# Patient Record
Sex: MALE | Race: WHITE | NOT HISPANIC OR LATINO | Employment: FULL TIME | URBAN - NONMETROPOLITAN AREA
[De-identification: names, ages, dates, MRNs, and addresses within clinical notes are randomized per-mention and may not be internally consistent; named-entity substitution may affect disease eponyms.]

---

## 2019-04-04 ENCOUNTER — APPOINTMENT (OUTPATIENT)
Dept: GENERAL RADIOLOGY | Facility: HOSPITAL | Age: 29
End: 2019-04-04

## 2019-04-04 ENCOUNTER — HOSPITAL ENCOUNTER (EMERGENCY)
Facility: HOSPITAL | Age: 29
Discharge: HOME | End: 2019-04-05
Attending: EMERGENCY MEDICINE | Admitting: EMERGENCY MEDICINE

## 2019-04-04 ENCOUNTER — APPOINTMENT (OUTPATIENT)
Dept: CT IMAGING | Facility: HOSPITAL | Age: 29
End: 2019-04-04

## 2019-04-04 VITALS
WEIGHT: 315 LBS | HEIGHT: 69 IN | DIASTOLIC BLOOD PRESSURE: 78 MMHG | SYSTOLIC BLOOD PRESSURE: 156 MMHG | TEMPERATURE: 97.9 F | HEART RATE: 100 BPM | OXYGEN SATURATION: 97 % | RESPIRATION RATE: 18 BRPM | BODY MASS INDEX: 46.65 KG/M2

## 2019-04-04 DIAGNOSIS — S82.142A CLOSED FRACTURE OF LEFT TIBIAL PLATEAU, INITIAL ENCOUNTER: Primary | ICD-10-CM

## 2019-04-04 DIAGNOSIS — S81.811A LACERATION OF RIGHT LOWER LEG, INITIAL ENCOUNTER: ICD-10-CM

## 2019-04-04 PROCEDURE — 73590 X-RAY EXAM OF LOWER LEG: CPT

## 2019-04-04 PROCEDURE — 73562 X-RAY EXAM OF KNEE 3: CPT

## 2019-04-04 PROCEDURE — 76377 3D RENDER W/INTRP POSTPROCES: CPT

## 2019-04-04 PROCEDURE — 99283 EMERGENCY DEPT VISIT LOW MDM: CPT

## 2019-04-04 PROCEDURE — 73700 CT LOWER EXTREMITY W/O DYE: CPT

## 2019-04-04 RX ORDER — HYDROCODONE BITARTRATE AND ACETAMINOPHEN 7.5; 325 MG/1; MG/1
1 TABLET ORAL ONCE
Status: COMPLETED | OUTPATIENT
Start: 2019-04-04 | End: 2019-04-05

## 2019-04-04 RX ORDER — ONDANSETRON 4 MG/1
4 TABLET, ORALLY DISINTEGRATING ORAL ONCE
Status: COMPLETED | OUTPATIENT
Start: 2019-04-04 | End: 2019-04-05

## 2019-04-04 RX ORDER — BACITRACIN ZINC 500 [USP'U]/G
OINTMENT TOPICAL ONCE
Status: COMPLETED | OUTPATIENT
Start: 2019-04-04 | End: 2019-04-04

## 2019-04-04 RX ORDER — IBUPROFEN 800 MG/1
800 TABLET ORAL ONCE
Status: COMPLETED | OUTPATIENT
Start: 2019-04-04 | End: 2019-04-04

## 2019-04-04 RX ORDER — LIDOCAINE HYDROCHLORIDE 10 MG/ML
10 INJECTION, SOLUTION INFILTRATION; PERINEURAL ONCE
Status: COMPLETED | OUTPATIENT
Start: 2019-04-04 | End: 2019-04-04

## 2019-04-04 RX ORDER — ACETAMINOPHEN 325 MG/1
650 TABLET ORAL ONCE
Status: COMPLETED | OUTPATIENT
Start: 2019-04-04 | End: 2019-04-04

## 2019-04-04 RX ADMIN — IBUPROFEN 800 MG: 800 TABLET ORAL at 21:45

## 2019-04-04 RX ADMIN — ACETAMINOPHEN 650 MG: 325 TABLET, FILM COATED ORAL at 21:45

## 2019-04-04 RX ADMIN — BACITRACIN ZINC: 500 OINTMENT TOPICAL at 21:47

## 2019-04-04 RX ADMIN — LIDOCAINE HYDROCHLORIDE 10 ML: 10 INJECTION, SOLUTION INFILTRATION; PERINEURAL at 21:47

## 2019-04-05 ENCOUNTER — PREP FOR SURGERY (OUTPATIENT)
Dept: OTHER | Facility: HOSPITAL | Age: 29
End: 2019-04-05

## 2019-04-05 ENCOUNTER — HOSPITAL ENCOUNTER (OUTPATIENT)
Dept: GENERAL RADIOLOGY | Facility: HOSPITAL | Age: 29
Discharge: HOME | End: 2019-04-05
Admitting: PHYSICIAN ASSISTANT

## 2019-04-05 ENCOUNTER — APPOINTMENT (OUTPATIENT)
Dept: PREADMISSION TESTING | Facility: HOSPITAL | Age: 29
End: 2019-04-05

## 2019-04-05 ENCOUNTER — OFFICE VISIT (OUTPATIENT)
Dept: ORTHOPEDIC SURGERY | Facility: CLINIC | Age: 29
End: 2019-04-05

## 2019-04-05 VITALS
SYSTOLIC BLOOD PRESSURE: 156 MMHG | HEIGHT: 70 IN | BODY MASS INDEX: 46.63 KG/M2 | DIASTOLIC BLOOD PRESSURE: 91 MMHG | HEART RATE: 97 BPM | OXYGEN SATURATION: 97 %

## 2019-04-05 VITALS — HEIGHT: 70 IN | WEIGHT: 315 LBS | BODY MASS INDEX: 45.1 KG/M2 | RESPIRATION RATE: 15 BRPM

## 2019-04-05 DIAGNOSIS — S82.142A CLOSED FRACTURE OF LEFT TIBIAL PLATEAU, INITIAL ENCOUNTER: Primary | ICD-10-CM

## 2019-04-05 DIAGNOSIS — S82.142A CLOSED FRACTURE OF LEFT TIBIAL PLATEAU, INITIAL ENCOUNTER: ICD-10-CM

## 2019-04-05 LAB
ALBUMIN SERPL-MCNC: 4.6 G/DL (ref 3.5–5)
ALBUMIN/GLOB SERPL: 1.6 G/DL (ref 1–2)
ALP SERPL-CCNC: 95 U/L (ref 38–126)
ALT SERPL W P-5'-P-CCNC: 28 U/L (ref 13–69)
ANION GAP SERPL CALCULATED.3IONS-SCNC: 14.1 MMOL/L (ref 10–20)
AST SERPL-CCNC: 29 U/L (ref 15–46)
BASOPHILS # BLD AUTO: 0.06 10*3/MM3 (ref 0–0.2)
BASOPHILS NFR BLD AUTO: 0.4 % (ref 0–1.5)
BILIRUB SERPL-MCNC: 0.4 MG/DL (ref 0.2–1.3)
BILIRUB UR QL STRIP: NEGATIVE
BUN BLD-MCNC: 12 MG/DL (ref 7–20)
BUN/CREAT SERPL: 15 (ref 6.3–21.9)
CALCIUM SPEC-SCNC: 9.5 MG/DL (ref 8.4–10.2)
CHLORIDE SERPL-SCNC: 100 MMOL/L (ref 98–107)
CLARITY UR: CLEAR
CO2 SERPL-SCNC: 27 MMOL/L (ref 26–30)
COLOR UR: ABNORMAL
CREAT BLD-MCNC: 0.8 MG/DL (ref 0.6–1.3)
DEPRECATED RDW RBC AUTO: 46.1 FL (ref 37–54)
EOSINOPHIL # BLD AUTO: 0.15 10*3/MM3 (ref 0–0.4)
EOSINOPHIL NFR BLD AUTO: 1 % (ref 0.3–6.2)
ERYTHROCYTE [DISTWIDTH] IN BLOOD BY AUTOMATED COUNT: 14 % (ref 12.3–15.4)
GFR SERPL CREATININE-BSD FRML MDRD: 114 ML/MIN/1.73
GLOBULIN UR ELPH-MCNC: 2.9 GM/DL
GLUCOSE BLD-MCNC: 127 MG/DL (ref 74–98)
GLUCOSE UR STRIP-MCNC: NEGATIVE MG/DL
HCT VFR BLD AUTO: 46.6 % (ref 37.5–51)
HGB BLD-MCNC: 15.5 G/DL (ref 13–17.7)
HGB UR QL STRIP.AUTO: NEGATIVE
IMM GRANULOCYTES # BLD AUTO: 0.07 10*3/MM3 (ref 0–0.05)
IMM GRANULOCYTES NFR BLD AUTO: 0.5 % (ref 0–0.5)
KETONES UR QL STRIP: ABNORMAL
LEUKOCYTE ESTERASE UR QL STRIP.AUTO: NEGATIVE
LYMPHOCYTES # BLD AUTO: 2.94 10*3/MM3 (ref 0.7–3.1)
LYMPHOCYTES NFR BLD AUTO: 20.3 % (ref 19.6–45.3)
MCH RBC QN AUTO: 29.8 PG (ref 26.6–33)
MCHC RBC AUTO-ENTMCNC: 33.3 G/DL (ref 31.5–35.7)
MCV RBC AUTO: 89.4 FL (ref 79–97)
MONOCYTES # BLD AUTO: 1.08 10*3/MM3 (ref 0.1–0.9)
MONOCYTES NFR BLD AUTO: 7.5 % (ref 5–12)
NEUTROPHILS # BLD AUTO: 10.17 10*3/MM3 (ref 1.4–7)
NEUTROPHILS NFR BLD AUTO: 70.3 % (ref 42.7–76)
NITRITE UR QL STRIP: NEGATIVE
NRBC BLD AUTO-RTO: 0 /100 WBC (ref 0–0)
PH UR STRIP.AUTO: 5.5 [PH] (ref 5–8)
PLATELET # BLD AUTO: 311 10*3/MM3 (ref 140–450)
PMV BLD AUTO: 9.6 FL (ref 6–12)
POTASSIUM BLD-SCNC: 4.1 MMOL/L (ref 3.5–5.1)
PROT SERPL-MCNC: 7.5 G/DL (ref 6.3–8.2)
PROT UR QL STRIP: ABNORMAL
RBC # BLD AUTO: 5.21 10*6/MM3 (ref 4.14–5.8)
SODIUM BLD-SCNC: 137 MMOL/L (ref 137–145)
SP GR UR STRIP: >=1.03 (ref 1–1.03)
UROBILINOGEN UR QL STRIP: ABNORMAL
WBC NRBC COR # BLD: 14.47 10*3/MM3 (ref 3.4–10.8)

## 2019-04-05 PROCEDURE — 80053 COMPREHEN METABOLIC PANEL: CPT | Performed by: PHYSICIAN ASSISTANT

## 2019-04-05 PROCEDURE — 93005 ELECTROCARDIOGRAM TRACING: CPT | Performed by: PHYSICIAN ASSISTANT

## 2019-04-05 PROCEDURE — 85025 COMPLETE CBC W/AUTO DIFF WBC: CPT | Performed by: PHYSICIAN ASSISTANT

## 2019-04-05 PROCEDURE — 81003 URINALYSIS AUTO W/O SCOPE: CPT | Performed by: PHYSICIAN ASSISTANT

## 2019-04-05 PROCEDURE — 36415 COLL VENOUS BLD VENIPUNCTURE: CPT

## 2019-04-05 PROCEDURE — 87081 CULTURE SCREEN ONLY: CPT | Performed by: PHYSICIAN ASSISTANT

## 2019-04-05 PROCEDURE — 71046 X-RAY EXAM CHEST 2 VIEWS: CPT

## 2019-04-05 PROCEDURE — 99203 OFFICE O/P NEW LOW 30 MIN: CPT | Performed by: PHYSICIAN ASSISTANT

## 2019-04-05 RX ORDER — IBUPROFEN 600 MG/1
600 TABLET ORAL EVERY 8 HOURS PRN
Qty: 12 TABLET | Refills: 0 | Status: SHIPPED | OUTPATIENT
Start: 2019-04-05 | End: 2019-08-15

## 2019-04-05 RX ORDER — CEFAZOLIN SODIUM 2 G/50ML
2 SOLUTION INTRAVENOUS
Status: CANCELLED | OUTPATIENT
Start: 2019-04-11 | End: 2019-04-12

## 2019-04-05 RX ORDER — ONDANSETRON 4 MG/1
4 TABLET, ORALLY DISINTEGRATING ORAL EVERY 8 HOURS PRN
Qty: 8 TABLET | Refills: 0 | Status: SHIPPED | OUTPATIENT
Start: 2019-04-05 | End: 2019-08-15

## 2019-04-05 RX ORDER — HYDROCODONE BITARTRATE AND ACETAMINOPHEN 5; 325 MG/1; MG/1
1 TABLET ORAL EVERY 6 HOURS PRN
Qty: 12 TABLET | Refills: 0 | Status: SHIPPED | OUTPATIENT
Start: 2019-04-05 | End: 2019-04-09 | Stop reason: HOSPADM

## 2019-04-05 RX ADMIN — ONDANSETRON 4 MG: 4 TABLET, ORALLY DISINTEGRATING ORAL at 00:25

## 2019-04-05 RX ADMIN — HYDROCODONE BITARTRATE AND ACETAMINOPHEN 1 TABLET: 7.5; 325 TABLET ORAL at 00:25
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
## 2019-04-05 NOTE — DISCHARGE INSTRUCTIONS
Wear knee immobilizer use crutches until follow-up with orthopedics...... sutures to be removed in 10-12 days
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
## 2019-04-07 LAB — MRSA SPEC QL CULT: NORMAL

## 2019-04-08 RX ORDER — HYDROCODONE BITARTRATE AND ACETAMINOPHEN 7.5; 325 MG/1; MG/1
1 TABLET ORAL EVERY 6 HOURS PRN
Qty: 28 TABLET | Refills: 0 | Status: SHIPPED | OUTPATIENT
Start: 2019-04-08 | End: 2019-08-15

## 2019-04-09 ENCOUNTER — ANESTHESIA (OUTPATIENT)
Dept: PERIOP | Facility: HOSPITAL | Age: 29
End: 2019-04-09

## 2019-04-09 ENCOUNTER — HOSPITAL ENCOUNTER (OUTPATIENT)
Facility: HOSPITAL | Age: 29
Setting detail: HOSPITAL OUTPATIENT SURGERY
Discharge: HOME | End: 2019-04-09
Attending: ORTHOPAEDIC SURGERY | Admitting: ORTHOPAEDIC SURGERY

## 2019-04-09 ENCOUNTER — APPOINTMENT (OUTPATIENT)
Dept: GENERAL RADIOLOGY | Facility: HOSPITAL | Age: 29
End: 2019-04-09

## 2019-04-09 ENCOUNTER — ANESTHESIA EVENT (OUTPATIENT)
Dept: PERIOP | Facility: HOSPITAL | Age: 29
End: 2019-04-09

## 2019-04-09 VITALS
TEMPERATURE: 98.5 F | OXYGEN SATURATION: 94 % | DIASTOLIC BLOOD PRESSURE: 90 MMHG | SYSTOLIC BLOOD PRESSURE: 149 MMHG | HEART RATE: 101 BPM | RESPIRATION RATE: 18 BRPM

## 2019-04-09 LAB
DEPRECATED RDW RBC AUTO: 44.6 FL (ref 37–54)
ERYTHROCYTE [DISTWIDTH] IN BLOOD BY AUTOMATED COUNT: 13.7 % (ref 12.3–15.4)
HCT VFR BLD AUTO: 43 % (ref 37.5–51)
HGB BLD-MCNC: 14.7 G/DL (ref 13–17.7)
MCH RBC QN AUTO: 30.4 PG (ref 26.6–33)
MCHC RBC AUTO-ENTMCNC: 34.2 G/DL (ref 31.5–35.7)
MCV RBC AUTO: 89 FL (ref 79–97)
PLATELET # BLD AUTO: 335 10*3/MM3 (ref 140–450)
PMV BLD AUTO: 8.7 FL (ref 6–12)
RBC # BLD AUTO: 4.83 10*6/MM3 (ref 4.14–5.8)
WBC NRBC COR # BLD: 9.52 10*3/MM3 (ref 3.4–10.8)

## 2019-04-09 PROCEDURE — 25010000002 ONDANSETRON PER 1 MG: Performed by: NURSE ANESTHETIST, CERTIFIED REGISTERED

## 2019-04-09 PROCEDURE — 25010000002 PROPOFOL 200 MG/20ML EMULSION: Performed by: NURSE ANESTHETIST, CERTIFIED REGISTERED

## 2019-04-09 PROCEDURE — C1713 ANCHOR/SCREW BN/BN,TIS/BN: HCPCS | Performed by: ORTHOPAEDIC SURGERY

## 2019-04-09 PROCEDURE — 25010000002 ROPIVACAINE 0.75 % SOLUTION: Performed by: ORTHOPAEDIC SURGERY

## 2019-04-09 PROCEDURE — 25010000002 MIDAZOLAM PER 1 MG: Performed by: NURSE ANESTHETIST, CERTIFIED REGISTERED

## 2019-04-09 PROCEDURE — 25010000002 FENTANYL CITRATE (PF) 100 MCG/2ML SOLUTION: Performed by: NURSE ANESTHETIST, CERTIFIED REGISTERED

## 2019-04-09 PROCEDURE — 29881 ARTHRS KNE SRG MNISECTMY M/L: CPT | Performed by: ORTHOPAEDIC SURGERY

## 2019-04-09 PROCEDURE — 29855 TIBIAL ARTHROSCOPY/SURGERY: CPT | Performed by: ORTHOPAEDIC SURGERY

## 2019-04-09 PROCEDURE — 25010000002 DEXAMETHASONE SODIUM PHOSPHATE 10 MG/ML SOLUTION: Performed by: NURSE ANESTHETIST, CERTIFIED REGISTERED

## 2019-04-09 PROCEDURE — 25010000003 CEFAZOLIN SODIUM-DEXTROSE 2-3 GM-%(50ML) RECONSTITUTED SOLUTION: Performed by: PHYSICIAN ASSISTANT

## 2019-04-09 PROCEDURE — 25010000002 DEXAMETHASONE PER 1 MG: Performed by: NURSE ANESTHETIST, CERTIFIED REGISTERED

## 2019-04-09 PROCEDURE — 85027 COMPLETE CBC AUTOMATED: CPT | Performed by: ORTHOPAEDIC SURGERY

## 2019-04-09 PROCEDURE — 76000 FLUOROSCOPY <1 HR PHYS/QHP: CPT

## 2019-04-09 DEVICE — IMPLANTABLE DEVICE: Type: IMPLANTABLE DEVICE | Site: KNEE | Status: FUNCTIONAL

## 2019-04-09 RX ORDER — FENTANYL CITRATE 50 UG/ML
INJECTION, SOLUTION INTRAMUSCULAR; INTRAVENOUS AS NEEDED
Status: DISCONTINUED | OUTPATIENT
Start: 2019-04-09 | End: 2019-04-09 | Stop reason: SURG

## 2019-04-09 RX ORDER — SODIUM CHLORIDE 0.9 % (FLUSH) 0.9 %
3 SYRINGE (ML) INJECTION AS NEEDED
Status: DISCONTINUED | OUTPATIENT
Start: 2019-04-09 | End: 2019-04-09 | Stop reason: HOSPADM

## 2019-04-09 RX ORDER — DEXAMETHASONE SODIUM PHOSPHATE 10 MG/ML
INJECTION, SOLUTION INTRAMUSCULAR; INTRAVENOUS
Status: COMPLETED
Start: 2019-04-09 | End: 2019-04-09

## 2019-04-09 RX ORDER — PROPOFOL 10 MG/ML
INJECTION, EMULSION INTRAVENOUS AS NEEDED
Status: DISCONTINUED | OUTPATIENT
Start: 2019-04-09 | End: 2019-04-09 | Stop reason: SURG

## 2019-04-09 RX ORDER — SODIUM CHLORIDE 9 MG/ML
INJECTION, SOLUTION INTRAVENOUS CONTINUOUS PRN
Status: DISCONTINUED | OUTPATIENT
Start: 2019-04-09 | End: 2019-04-09 | Stop reason: SURG

## 2019-04-09 RX ORDER — KETAMINE HCL IN NACL, ISO-OSM 100MG/10ML
SYRINGE (ML) INJECTION AS NEEDED
Status: DISCONTINUED | OUTPATIENT
Start: 2019-04-09 | End: 2019-04-09 | Stop reason: SURG

## 2019-04-09 RX ORDER — LIDOCAINE HYDROCHLORIDE 10 MG/ML
INJECTION, SOLUTION INFILTRATION; PERINEURAL AS NEEDED
Status: DISCONTINUED | OUTPATIENT
Start: 2019-04-09 | End: 2019-04-09 | Stop reason: HOSPADM

## 2019-04-09 RX ORDER — ONDANSETRON 2 MG/ML
INJECTION INTRAMUSCULAR; INTRAVENOUS AS NEEDED
Status: DISCONTINUED | OUTPATIENT
Start: 2019-04-09 | End: 2019-04-09 | Stop reason: SURG

## 2019-04-09 RX ORDER — MIDAZOLAM HYDROCHLORIDE 1 MG/ML
INJECTION INTRAMUSCULAR; INTRAVENOUS AS NEEDED
Status: DISCONTINUED | OUTPATIENT
Start: 2019-04-09 | End: 2019-04-09 | Stop reason: SURG

## 2019-04-09 RX ORDER — BUPIVACAINE HYDROCHLORIDE 5 MG/ML
INJECTION, SOLUTION EPIDURAL; INTRACAUDAL
Status: COMPLETED
Start: 2019-04-09 | End: 2019-04-09

## 2019-04-09 RX ORDER — DEXAMETHASONE SODIUM PHOSPHATE 4 MG/ML
INJECTION, SOLUTION INTRA-ARTICULAR; INTRALESIONAL; INTRAMUSCULAR; INTRAVENOUS; SOFT TISSUE AS NEEDED
Status: DISCONTINUED | OUTPATIENT
Start: 2019-04-09 | End: 2019-04-09 | Stop reason: SURG

## 2019-04-09 RX ORDER — ONDANSETRON 2 MG/ML
4 INJECTION INTRAMUSCULAR; INTRAVENOUS ONCE AS NEEDED
Status: DISCONTINUED | OUTPATIENT
Start: 2019-04-09 | End: 2019-04-09 | Stop reason: HOSPADM

## 2019-04-09 RX ORDER — SODIUM CHLORIDE, SODIUM LACTATE, POTASSIUM CHLORIDE, CALCIUM CHLORIDE 600; 310; 30; 20 MG/100ML; MG/100ML; MG/100ML; MG/100ML
1000 INJECTION, SOLUTION INTRAVENOUS CONTINUOUS
Status: DISCONTINUED | OUTPATIENT
Start: 2019-04-09 | End: 2019-04-09 | Stop reason: HOSPADM

## 2019-04-09 RX ORDER — DEXAMETHASONE SODIUM PHOSPHATE 10 MG/ML
INJECTION, SOLUTION INTRAMUSCULAR; INTRAVENOUS AS NEEDED
Status: DISCONTINUED | OUTPATIENT
Start: 2019-04-09 | End: 2019-04-09 | Stop reason: SURG

## 2019-04-09 RX ORDER — BUPIVACAINE HYDROCHLORIDE 5 MG/ML
INJECTION, SOLUTION EPIDURAL; INTRACAUDAL AS NEEDED
Status: DISCONTINUED | OUTPATIENT
Start: 2019-04-09 | End: 2019-04-09 | Stop reason: SURG

## 2019-04-09 RX ORDER — MEPERIDINE HYDROCHLORIDE 50 MG/ML
50 INJECTION INTRAMUSCULAR; INTRAVENOUS; SUBCUTANEOUS ONCE AS NEEDED
Status: DISCONTINUED | OUTPATIENT
Start: 2019-04-09 | End: 2019-04-09 | Stop reason: HOSPADM

## 2019-04-09 RX ORDER — CEFAZOLIN SODIUM 2 G/50ML
2 SOLUTION INTRAVENOUS
Status: COMPLETED | OUTPATIENT
Start: 2019-04-09 | End: 2019-04-09

## 2019-04-09 RX ORDER — ROPIVACAINE HYDROCHLORIDE 7.5 MG/ML
INJECTION, SOLUTION EPIDURAL; PERINEURAL AS NEEDED
Status: DISCONTINUED | OUTPATIENT
Start: 2019-04-09 | End: 2019-04-09 | Stop reason: HOSPADM

## 2019-04-09 RX ADMIN — FENTANYL CITRATE 50 MCG: 50 INJECTION, SOLUTION INTRAMUSCULAR; INTRAVENOUS at 14:56

## 2019-04-09 RX ADMIN — PROPOFOL 150 MG: 10 INJECTION, EMULSION INTRAVENOUS at 15:02

## 2019-04-09 RX ADMIN — Medication 25 MG: at 15:03

## 2019-04-09 RX ADMIN — MIDAZOLAM HYDROCHLORIDE 2 MG: 1 INJECTION, SOLUTION INTRAMUSCULAR; INTRAVENOUS at 13:57

## 2019-04-09 RX ADMIN — DEXAMETHASONE SODIUM PHOSPHATE 10 MG: 10 INJECTION, SOLUTION INTRAMUSCULAR; INTRAVENOUS at 14:06

## 2019-04-09 RX ADMIN — SODIUM CHLORIDE, POTASSIUM CHLORIDE, SODIUM LACTATE AND CALCIUM CHLORIDE 1000 ML: 600; 310; 30; 20 INJECTION, SOLUTION INTRAVENOUS at 12:20

## 2019-04-09 RX ADMIN — SODIUM CHLORIDE: 9 INJECTION, SOLUTION INTRAVENOUS at 16:05

## 2019-04-09 RX ADMIN — Medication 252 MG: at 14:58

## 2019-04-09 RX ADMIN — FENTANYL CITRATE 50 MCG: 50 INJECTION, SOLUTION INTRAMUSCULAR; INTRAVENOUS at 16:20

## 2019-04-09 RX ADMIN — PROPOFOL 50 MG: 10 INJECTION, EMULSION INTRAVENOUS at 15:03

## 2019-04-09 RX ADMIN — BUPIVACAINE HYDROCHLORIDE 20 ML: 5 INJECTION, SOLUTION EPIDURAL; INTRACAUDAL; PERINEURAL at 14:06

## 2019-04-09 RX ADMIN — DEXAMETHASONE SODIUM PHOSPHATE 8 MG: 4 INJECTION, SOLUTION INTRAMUSCULAR; INTRAVENOUS at 15:17

## 2019-04-09 RX ADMIN — FENTANYL CITRATE 100 MCG: 50 INJECTION, SOLUTION INTRAMUSCULAR; INTRAVENOUS at 13:57

## 2019-04-09 RX ADMIN — FENTANYL CITRATE 50 MCG: 50 INJECTION, SOLUTION INTRAMUSCULAR; INTRAVENOUS at 16:33

## 2019-04-09 RX ADMIN — LIDOCAINE HYDROCHLORIDE 50 MG: 20 INJECTION, SOLUTION INTRAVENOUS at 15:01

## 2019-04-09 RX ADMIN — FENTANYL CITRATE 50 MCG: 50 INJECTION, SOLUTION INTRAMUSCULAR; INTRAVENOUS at 14:59

## 2019-04-09 RX ADMIN — ONDANSETRON 4 MG: 2 INJECTION INTRAMUSCULAR; INTRAVENOUS at 15:17

## 2019-04-09 RX ADMIN — CEFAZOLIN SODIUM 2 G: 2 SOLUTION INTRAVENOUS at 14:55

## 2019-04-09 RX ADMIN — FAMOTIDINE 20 MG: 10 INJECTION, SOLUTION INTRAVENOUS at 12:25

## 2019-04-09 NOTE — ANESTHESIA PROCEDURE NOTES
Peripheral Block      Patient reassessed immediately prior to procedure    Start time: 4/9/2019 1:56 PM  Stop time: 4/9/2019 2:06 PM  Reason for block: procedure for pain and post-op pain management  Performed by  CRNA: Ambrose Antony CRNA  Preanesthetic Checklist  Completed: patient identified, site marked, surgical consent, pre-op evaluation, timeout performed, IV checked, risks and benefits discussed and monitors and equipment checked  Prep:  Pt Position: supine  Sterile barriers:cap, gloves and mask  Prep: ChloraPrep  Patient monitoring: blood pressure monitoring, continuous pulse oximetry and EKG  Procedure  Sedation:yes    Guidance:ultrasound guided  ULTRASOUND INTERPRETATION.  Using ultrasound guidance a 21 G gauge needle was placed in close proximity to the femoral nerve, at which point, under ultrasound guidance anesthetic was injected in the area of the nerve and spread of the anesthesia was seen on ultrasound in close proximity thereto.  There were no abnormalities seen on ultrasound; a digital image was taken; and the patient tolerated the procedure with no complications. Images:still images obtained    Laterality:left  Block Type:adductor canal block  Injection Technique:single-shot  Needle Type:echogenic  Needle Gauge:21 G  Resistance on Injection: none    Post Assessment  Injection Assessment: negative aspiration for heme, no paresthesia on injection and incremental injection  Patient Tolerance:comfortable throughout block  Complications:no  Additional Notes   Incremental injection with negative aspirate. No pain on injection. Normal injection resistance.  Vascular puncture avoided. Nerves and surrounding tissues identified with local spread around nerves visualized.

## 2019-04-09 NOTE — DISCHARGE INSTRUCTIONS
Keep the affected extremity elevated above  level of the heart.  Use your ice pack as instructed, do not use continuously.  Use your crutches as directed  NO WEIGHT BEARING TO LEFT LEG/FOOT    Follow your physicians instructions as previously directed.    No pushing, pulling, tugging,  heavy lifting, or strenuous activity.  No major decision making, driving, or drinking alcoholic beverages for 24 hours. ( due to the medications you have  received)  Always use good hand hygiene/washing techniques.  NO driving while taking pain medications.    To assist you in voiding:  Drink plenty of fluids  Listen to running water while attempting to void.    If you are unable to urinate and you have an uncomfortable urge to void or it has been   6 hours since you were discharged, return to the Emergency Room

## 2019-04-09 NOTE — ANESTHESIA POSTPROCEDURE EVALUATION
Patient: Sandor Cordon    Procedure Summary     Date:  04/09/19 Room / Location:  [facility] OR  /  [facility]    Anesthesia Start:  1455 Anesthesia Stop:      Procedure:  knee diagnostic arthroscopy left, reduction of tibial plateau fracture, fixation with two compression screws, assessment of ACL rupture, partial lateral meniscectomy, lateral chondroplasty (Left Knee) Diagnosis:       Closed fracture of left tibial plateau, initial encounter      (Closed fracture of left tibial plateau, initial encounter [S82.142A])    Surgeon:  Wes Maradiaga MD Provider:  Adalberto Mueller CRNA    Anesthesia Type:  general with block ASA Status:  3          Anesthesia Type: general with block  Last vitals  BP   135/71 (04/09/19 1448)   Temp   98.1 °F (36.7 °C) (04/09/19 1149)   Pulse   88 (04/09/19 1448)   Resp   16 (04/09/19 1448)     SpO2   100 % (04/09/19 1448)     Post Anesthesia Care and Evaluation    Patient location during evaluation: PACU  Patient participation: complete - patient participated  Level of consciousness: awake  Pain score: 3  Pain management: adequate  Airway patency: patent  Anesthetic complications: No anesthetic complications  PONV Status: none  Cardiovascular status: acceptable  Respiratory status: acceptable and face mask  Hydration status: acceptable    Comments: vsss resp spont, reflexes intact, responsive, report given to pacu nurse

## 2019-04-09 NOTE — ANESTHESIA PROCEDURE NOTES
Airway  Urgency: elective      General Information and Staff    Patient location during procedure: OR  CRNA: Adalberto Mueller CRNA    Indications and Patient Condition  Indications: management.    Preoxygenated: yes  Mask difficulty assessment: 2 - vent by mask + OA or adjuvant +/- NMBA    Final Airway Details  Final airway type: supraglottic airway      Successful airway: classic  Size 4    Number of attempts at approach: 1    Additional Comments  Lma placed by standard fashion, cuff up with mov  approx 30 ml, bbs and expansion =, + etco, tolerated without adverse rx.

## 2019-04-09 NOTE — INTERVAL H&P NOTE
H&P reviewed. The patient was examined and there are no changes to the H&P, inclusive of the physical exam heart, lungs and procedure site specific exam as noted on the current (within 30 days) history and physical full detailed report.    Vitals:    04/09/19 1428   BP: 138/88   Pulse: 90   Resp: 16   Temp:    SpO2: 100%       Wes Maradiaga MD  4/9/2019  2:39 PM

## 2019-04-09 NOTE — ANESTHESIA PREPROCEDURE EVALUATION
Anesthesia Evaluation     Patient summary reviewed and Nursing notes reviewed   NPO Solid Status: > 8 hours  NPO Liquid Status: > 8 hours           Airway   Mallampati: II  TM distance: >3 FB  Neck ROM: full  Possible difficult intubation  Dental      Pulmonary    (+) a smoker Current, COPD, sleep apnea, decreased breath sounds,   Cardiovascular     (+) hypertension,       Neuro/Psych  GI/Hepatic/Renal/Endo    (+) obesity, morbid obesity,  diabetes mellitus ( probable obese, genaro, fbs elevated) type 2 poorly controlled,     Musculoskeletal     (+) chronic pain, myalgias,   Abdominal   (+) obese,    Substance History   (+) alcohol use,      OB/GYN          Other        ROS/Med Hx Other: Labs reviewed  cxr nad  ekg sr                    Anesthesia Plan    ASA 3     general with block     intravenous induction   Anesthetic plan, all risks, benefits, and alternatives have been provided, discussed and informed consent has been obtained with: patient.

## 2019-04-18 NOTE — OP NOTE
OPERATIVE REPORT      PATIENT NAME:  Sandor Cordon                            YOB: 1990       PREOP DIAGNOSIS:    Left lateral tibia plateau posterolateral depressed unstable fracture and ACL rupture.    POSTOP DIAGNOSIS:   Same plus traumatic displaced fragmented posterior horn lateral meniscus tear.    PROCEDURE:    Left knee diagnostic arthroscopy with arthroscopic assisted reduction and compression screws internal fixation of lateral tibia plateau fracture, partial lateral menisectomy and two compartment chondroplasty with assessment of ACL rupture.    SURGEON:     Kelvin Maradiaga MD    OPERATIVE TEAM:  Circulator: Du Craft RN; Matt Mantilla RN  Radiology Technologist: Jacek Menjivar Rebecca  Scrub Person: Joel Hays; Kelly Sharma    ANESTHETIST:  LACEY: Adalberto Mueller CRNA    ANESTHESIA:   General    ESTIM BLOOD LOSS:   25 ml    FINDINGS:     Displaced depressed unstable posterior lateral tibial plateau fracture with traumatic displaced posterior horn lateral meniscus tears and chondral injuries.  Near complete proximal substance ACL rupture and insufficiency.    SPECIMENS:    None.    IMPLANTS:     Quantity of two Biomet 5.0 mm diameter partial thread cannulated compression screws.    DRAINS:     None.    COMPLICATIONS:    None.    DISPOSITION:    Stable to recovery.    INDICATIONS:   Painful traumatic knee effusion with depressed displaced and unstable posterior tibial plateau fracture with meniscal, chondral and ACL injuries and tears on clinical exam and imaging.    NARRATIVE:     Risks, benefits and alternatives discussed and informed consent for surgical procedure obtained.  Risks discussed including but not limited to anesthesia, infection, nerve/vessel/tendon injury, bone fracture, hardware changes, loosening or displacement, morbidities from any chronic medical conditions, DVT, PE and death.  Goals include pain relief, earlier mobilization and  rehabilitation to improve ambulatory and functional level.  Patient presents for planned fracture stabilization surgery.     Antibiotic prophylaxis was given.  Surgeon site marking and a time out were performed prior to the procedure.  Anesthesia was effective and well tolerated.  The patient was placed in the supine position with appropriate padding to observe skin precautions.  Also, care was taken to provide DVT/PE prophylaxis including athrombic protocol.  The knee and leg was prepped and draped in the usual sterile fashion.  A tourniquet was not used and there was good hemostasis throughout the procedure.  At the start of the procedure one percent lidocaine with epinephrine was injected at the knee portal sites.  After sterile prep and drape an arthroscopy of the knee was performed.    Anteromedial and anterolateral portals were made.  The patellofemoral compartment showed stable chondral surfaces though with some grade 3 crevice and fibrillation wear.  Patella tracking was central and stable.  In the medial compartment the medial meniscus and chondral surfaces were intact.  There was a patch of grade 3 fibrillation of the medial femoral condyle.  In the intercondylar notch, there was diffuse erythema of the ACL ligament and nearly complete rupture of the proximal ACL ligament substance consistent with recent injury. The PCL was visualized, probed and intact.  The lateral compartment showed 12-15 mm of depression of the posterior aspect lateral plateau and not central and contained though rather posterior and non-contained.  Consideration for subchondroplasty bone void filler was dismissed as with the bone fracture not contained there would likely be posterior leakage of the bone void  the hamstring area and as such the fracture not amenable to subchondroplasty.  The lateral tibia plateau had grade 3 and 4 osteochondral wear adjacent to the fracture.  There were small loose osteochondral chips that  were debrided and removed.  The lateral meniscus had a large tear with the torn portion stuck under a fracture fragment though could be easily released with a blunt nerve probe.  The popliteus tendon was visualized, probed and intact.  Using a grabber, multiple biters including straight, upward, curved and side biters, and a full radius shaver, removal of loose bodies, a partial lateral menisectomy and a two compartment chondroplasty of the involved areas were performed.  With C-arm image flouro, a small anterolateral incision permitted blunt curved clamp maneuvering of the depressed posterior lateral tibial plateau fracture main fragment.  With the arthroscope and C-arm images, an anatomic elevation of the depressed plateau fragment was confirmed.  The fracture reduction could then be stabilized with placement of two subchondral cannulated partial threaded compression screws with standard soft tissue protected drilling, depth gauge measure and screw placement.    Representative arthroscopic photos and C-arm images were saved showing a good reduction and position of the compression screws.  The knee joint was irrigated and suctioned clear.  The portal sites and percutaneous incisions were closed and a sterile dressing was applied.  A well padded knee immobilizer was applied.  Anesthesia was effective and well tolerated.  There were no complications of the procedure.  The patient was transferred stable to recovery.

## 2019-04-23 ENCOUNTER — HOSPITAL ENCOUNTER (OUTPATIENT)
Dept: ULTRASOUND IMAGING | Facility: HOSPITAL | Age: 29
Discharge: HOME | End: 2019-04-23
Admitting: PHYSICIAN ASSISTANT

## 2019-04-23 ENCOUNTER — OFFICE VISIT (OUTPATIENT)
Dept: ORTHOPEDIC SURGERY | Facility: CLINIC | Age: 29
End: 2019-04-23

## 2019-04-23 VITALS
OXYGEN SATURATION: 97 % | HEART RATE: 130 BPM | RESPIRATION RATE: 18 BRPM | HEIGHT: 70 IN | WEIGHT: 315 LBS | BODY MASS INDEX: 45.1 KG/M2

## 2019-04-23 DIAGNOSIS — R60.0 LEG EDEMA, LEFT: ICD-10-CM

## 2019-04-23 DIAGNOSIS — S82.142A CLOSED FRACTURE OF LEFT TIBIAL PLATEAU, INITIAL ENCOUNTER: ICD-10-CM

## 2019-04-23 DIAGNOSIS — S82.142A CLOSED FRACTURE OF LEFT TIBIAL PLATEAU, INITIAL ENCOUNTER: Primary | ICD-10-CM

## 2019-04-23 PROCEDURE — 93971 EXTREMITY STUDY: CPT

## 2019-04-23 PROCEDURE — 99024 POSTOP FOLLOW-UP VISIT: CPT | Performed by: PHYSICIAN ASSISTANT

## 2019-04-23 NOTE — PROGRESS NOTES
"Subjective   Patient ID: Sandor Cordon is a 28 y.o.  male is here today for a post-operative visit.  Post-op of the Left Knee (knee diagnostic arthroscopy left, reduction of tibial plateau fracture, fixation with two compression screws, assessment of ACL rupture, partial lateral meniscectomy, lateral chondroplasty 4/9/19)          CHIEF COMPLAINT:    History of Present Illness      Pain controlled: [] no   [x] yes   Medication refill requested: [x] no   [] yes    Patient compliant with instructions: [] no   [x] yes   Other: Reports fair progress since surgery.  Patient states when he stands for long periods of time and uses the knee brace his lower leg will turn dark purple/dark red but usually subsides with elevation rest     Past Medical History:   Diagnosis Date   • Hypertension         Past Surgical History:   Procedure Laterality Date   • NO PAST SURGERIES         No Known Allergies    Review of Systems   Constitutional: Negative for fever.   HENT: Negative for voice change.    Eyes: Negative for visual disturbance.   Respiratory: Negative for shortness of breath.    Cardiovascular: Negative for chest pain.   Gastrointestinal: Negative for abdominal distention and abdominal pain.   Genitourinary: Negative for dysuria.   Musculoskeletal: Positive for arthralgias. Negative for gait problem and joint swelling.   Skin: Negative for rash.   Neurological: Negative for speech difficulty.   Hematological: Does not bruise/bleed easily.   Psychiatric/Behavioral: Negative for confusion.       Objective   Pulse (!) 130   Resp 18   Ht 177.8 cm (70\")   Wt (!) 147 kg (325 lb)   SpO2 97%   BMI 46.63 kg/m²       Signs of infection: [x] no                    [] yes   Drainage: [x] no                    [] yes   Incision: [x] healing well     []healed well   Motor exam intact: [] no                    [x] yes   Neurovascular exam intact: [] no                    [x] yes   Signs of compartment syndrome: [x] no                "     [] yes   Signs of DVT: [x] no                    [] yes   Other:      Physical Exam   Constitutional: He appears well-developed.   Pulmonary/Chest: Effort normal.   Musculoskeletal:        Left knee: He exhibits swelling and ecchymosis. He exhibits no erythema. Tenderness found. Medial joint line and lateral joint line tenderness noted.   Neurological: He is alert.   Psychiatric: He has a normal mood and affect.   Vitals reviewed.    Ortho Exam    Extremity DVT signs are Positive with calf pain  Neurologic Exam    Assessment/Plan    Independent Review of Radiographic Studies:    Indication to evaluate fracture healing, and compared with prior imaging, shows interm fracture healing, callus formation and or periostitis in continued good position and alignment.  Laboratory and Other Studies:  No new results reviewed today.   Medical Decision Making:    Stable neurovascular exam.       Procedures     Sandor was seen today for post-op.    Diagnoses and all orders for this visit:    Closed fracture of left tibial plateau, initial encounter  -     US Venous Doppler Lower Extremity Left (duplex); Future  -     XR Knee 1 or 2 View Left    Leg edema, left  -     US Venous Doppler Lower Extremity Left (duplex); Future         Recommendations/Plan:     Sutures Staples or Pins [x] Removed today  [] At prior visit  [] Plan removal later   Physical therapy: []rehab facility  []outpatient referral  [] therapy ongoing not ordered now   Ultrasound: []not ordered         [x]order given to patient   Labs: [x]not ordered         []order given to patient   Weight Bearing status: []Full []WBAT []PWB []NWB []Other TTWB     Elevate leg for residual swelling  Ice, heat, and/or modalities as beneficial  Watch for signs and symptoms of infection  Wound care instructions given    There was a possibility that a small suture was remaining to the lateral horizontal incision site.  Patient is advised to monitor this closely for signs of  infection.     We will send for stat venous Doppler ultrasound.  I suspect he is experiencing dependent rubor to the left lower extremity as while he is lying horizontal and elevating the leg in the exam room there are no signs of skin discoloration.  He is neurovascularly intact.    FU in 2 weeks- plan to enroll in PT    Patient is encouraged and agreeable to call or return sooner for any issues or concerns.

## 2019-04-24 ENCOUNTER — APPOINTMENT (OUTPATIENT)
Dept: ULTRASOUND IMAGING | Facility: HOSPITAL | Age: 29
End: 2019-04-24

## 2019-05-07 ENCOUNTER — OFFICE VISIT (OUTPATIENT)
Dept: ORTHOPEDIC SURGERY | Facility: CLINIC | Age: 29
End: 2019-05-07

## 2019-05-07 VITALS — HEIGHT: 70 IN | BODY MASS INDEX: 45.1 KG/M2 | RESPIRATION RATE: 18 BRPM | WEIGHT: 315 LBS

## 2019-05-07 DIAGNOSIS — S83.512A RUPTURE OF ANTERIOR CRUCIATE LIGAMENT OF LEFT KNEE, INITIAL ENCOUNTER: ICD-10-CM

## 2019-05-07 DIAGNOSIS — S82.142D CLOSED FRACTURE OF LEFT TIBIAL PLATEAU WITH ROUTINE HEALING, SUBSEQUENT ENCOUNTER: Primary | ICD-10-CM

## 2019-05-07 PROCEDURE — 99024 POSTOP FOLLOW-UP VISIT: CPT | Performed by: PHYSICIAN ASSISTANT

## 2019-05-07 NOTE — PROGRESS NOTES
"Subjective   Patient ID: Sandor Cordon is a 28 y.o. right hand dominant male is here today for a post-operative visit.  Post-op and Pain of the Left Knee (Patient is here today for a post operative visit, he states his pain only comes if he's up a lot on the knee. )          CHIEF COMPLAINT:      History of Present Illness      Pain controlled: [] no   [x] yes   Medication refill requested: [x] no   [] yes    Patient compliant with instructions: [] no   [x] yes   Other: Reports good progress since surgery.     Past Medical History:   Diagnosis Date   • Hypertension         Past Surgical History:   Procedure Laterality Date   • NO PAST SURGERIES         No Known Allergies    Review of Systems   Constitutional: Negative for fever.   HENT: Negative for voice change.    Eyes: Negative for visual disturbance.   Respiratory: Negative for shortness of breath.    Cardiovascular: Negative for chest pain.   Gastrointestinal: Negative for abdominal distention and abdominal pain.   Genitourinary: Negative for dysuria.   Musculoskeletal: Positive for arthralgias. Negative for gait problem and joint swelling.   Skin: Negative for rash.   Neurological: Negative for speech difficulty.   Hematological: Does not bruise/bleed easily.   Psychiatric/Behavioral: Negative for confusion.       Objective   Resp 18   Ht 177.8 cm (70\")   Wt (!) 147 kg (325 lb)   BMI 46.63 kg/m²       Signs of infection: [x] no                    [] yes   Drainage: [x] no                    [] yes   Incision: [x] healing well     []healed well   Motor exam intact: [] no                    [x] yes   Neurovascular exam intact: [] no                    [x] yes   Signs of compartment syndrome: [x] no                    [] yes   Signs of DVT: [x] no                    [] yes   Other:      Physical Exam   Constitutional: He is oriented to person, place, and time. He appears well-developed.   Pulmonary/Chest: Effort normal.   Musculoskeletal:        Left knee: He " exhibits decreased range of motion (stiffness from recent immobilization). He exhibits no effusion, no ecchymosis and no erythema.   Neurological: He is alert and oriented to person, place, and time.   Psychiatric: He has a normal mood and affect.   Vitals reviewed.    Left Knee Exam     Range of Motion   Extension: 0   Flexion: 80     Tests   Drawer:  Anterior - 1+         Other   Erythema: absent  Scars: present  Sensation: normal  Swelling: mild  Effusion: no effusion present            Extremity DVT signs are Negative on physical exam with negative Nicolas sign, with no calf pain, with no palpable cords, with no increased pain with passive stretch/extension and with no skin tone change  Neurologic Exam     Mental Status   Oriented to person, place, and time.         Assessment/Plan   Independent Review of Radiographic Studies:    Indication to evaluate fracture healing, and compared with prior imaging, shows interim fracture healing with good maintained reduction and alignment and intact position of fracture fixation hardware.    Medical Decision Making:    Stable neurovascular exam.       Elizabeth Patten was seen today for post-op and pain.    Diagnoses and all orders for this visit:    Closed fracture of left tibial plateau with routine healing, subsequent encounter  -     XR Knee 1 or 2 View Left  -     Ambulatory Referral to Physical Therapy Evaluate and treat (Pre-ACL reconstruction rehab), Ortho; Stretching, ROM, Strengthening    Rupture of anterior cruciate ligament of left knee, initial encounter  -     Ambulatory Referral to Physical Therapy Evaluate and treat (Pre-ACL reconstruction rehab), Ortho; Stretching, ROM, Strengthening         Recommendations/Plan:     Sutures Staples or Pins [] Removed today  [x] At prior visit  [] Plan removal later   Physical therapy: []rehab facility  [x]outpatient referral  [] therapy ongoing   Ultrasound: [x]not ordered         []order given to patient   Labs:  [x]not ordered         []order given to patient   Weight Bearing status: []Full [x]WBAT []PWB []NWB []Other                No longer used a knee immobilizer.   Patient was provided a hinged knee brace to be worn for the instability of his ACL rupture.  He states he would like to wait till July 2019 to schedule reconstruction of ACL left knee  Patient is encouraged and agreeable to call or return sooner for any issues or concerns.

## 2019-05-08 ENCOUNTER — TREATMENT (OUTPATIENT)
Dept: PHYSICAL THERAPY | Facility: CLINIC | Age: 29
End: 2019-05-08

## 2019-05-08 DIAGNOSIS — Z98.890 S/P LATERAL MENISCECTOMY OF LEFT KNEE: ICD-10-CM

## 2019-05-08 DIAGNOSIS — S82.142A CLOSED FRACTURE OF LEFT TIBIAL PLATEAU, INITIAL ENCOUNTER: Primary | ICD-10-CM

## 2019-05-08 DIAGNOSIS — S83.512A RUPTURE OF ANTERIOR CRUCIATE LIGAMENT OF LEFT KNEE, INITIAL ENCOUNTER: ICD-10-CM

## 2019-05-08 PROCEDURE — 97162 PT EVAL MOD COMPLEX 30 MIN: CPT | Performed by: PHYSICAL THERAPIST

## 2019-05-08 PROCEDURE — 97110 THERAPEUTIC EXERCISES: CPT | Performed by: PHYSICAL THERAPIST
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
## 2019-05-10 ENCOUNTER — TREATMENT (OUTPATIENT)
Dept: PHYSICAL THERAPY | Facility: CLINIC | Age: 29
End: 2019-05-10

## 2019-05-10 DIAGNOSIS — S82.142A CLOSED FRACTURE OF LEFT TIBIAL PLATEAU, INITIAL ENCOUNTER: Primary | ICD-10-CM

## 2019-05-10 DIAGNOSIS — Z98.890 S/P LATERAL MENISCECTOMY OF LEFT KNEE: ICD-10-CM

## 2019-05-10 DIAGNOSIS — S83.512A RUPTURE OF ANTERIOR CRUCIATE LIGAMENT OF LEFT KNEE, INITIAL ENCOUNTER: ICD-10-CM

## 2019-05-10 PROCEDURE — 97116 GAIT TRAINING THERAPY: CPT | Performed by: PHYSICAL THERAPIST

## 2019-05-10 PROCEDURE — 97110 THERAPEUTIC EXERCISES: CPT | Performed by: PHYSICAL THERAPIST

## 2019-05-15 ENCOUNTER — TREATMENT (OUTPATIENT)
Dept: PHYSICAL THERAPY | Facility: CLINIC | Age: 29
End: 2019-05-15

## 2019-05-15 DIAGNOSIS — Z98.890 S/P LATERAL MENISCECTOMY OF LEFT KNEE: ICD-10-CM

## 2019-05-15 DIAGNOSIS — S82.142A CLOSED FRACTURE OF LEFT TIBIAL PLATEAU, INITIAL ENCOUNTER: Primary | ICD-10-CM

## 2019-05-15 DIAGNOSIS — S83.512A RUPTURE OF ANTERIOR CRUCIATE LIGAMENT OF LEFT KNEE, INITIAL ENCOUNTER: ICD-10-CM

## 2019-05-15 PROCEDURE — 97110 THERAPEUTIC EXERCISES: CPT | Performed by: PHYSICAL THERAPIST

## 2019-05-15 PROCEDURE — 97116 GAIT TRAINING THERAPY: CPT | Performed by: PHYSICAL THERAPIST

## 2019-05-15 NOTE — PROGRESS NOTES
Subjective   Sandor Cordon reports: No pain at rest. Worked some this week which was painful but has recuperated well on day off.     Objective   Patient using single crutch appropriately.     Left knee AAROM: 95 deg flexion     Stairs: able to step up/down 6 inch sten with hand rail.     See Exercise, Manual, and Modality Logs for complete treatment.       Assessment/Plan  Patient progressing well with strengthening exercises. ROM is improving, pt able to step up and down 6 inch step with good form and control.   Progress per Plan of Care           Manual Therapy:         mins  36598;  Therapeutic Exercise:    41     mins  12488;     Neuromuscular Shaniqua:        mins  39813;    Therapeutic Activity:          mins  59468;     Gait Training:      15     mins  90443;     Ultrasound:          mins  19868;   Iontophoresis          mins  79832   Electrical Stimulation:         mins  67047 ( );  Dry Needling         mins self-pay  Fluidotherapy          mins 88672    Timed Treatment:   56   mins   Total Treatment:     66   mins    Yani Haley, PT  Physical Therapist

## 2019-05-17 ENCOUNTER — TREATMENT (OUTPATIENT)
Dept: PHYSICAL THERAPY | Facility: CLINIC | Age: 29
End: 2019-05-17

## 2019-05-17 DIAGNOSIS — Z98.890 S/P LATERAL MENISCECTOMY OF LEFT KNEE: ICD-10-CM

## 2019-05-17 DIAGNOSIS — S83.512A RUPTURE OF ANTERIOR CRUCIATE LIGAMENT OF LEFT KNEE, INITIAL ENCOUNTER: ICD-10-CM

## 2019-05-17 DIAGNOSIS — S82.142A CLOSED FRACTURE OF LEFT TIBIAL PLATEAU, INITIAL ENCOUNTER: Primary | ICD-10-CM

## 2019-05-17 PROCEDURE — 97116 GAIT TRAINING THERAPY: CPT | Performed by: PHYSICAL THERAPIST

## 2019-05-17 PROCEDURE — 97110 THERAPEUTIC EXERCISES: CPT | Performed by: PHYSICAL THERAPIST

## 2019-05-17 NOTE — PROGRESS NOTES
Subjective   Sandor Cordon reports: 5/10 knee pain and soreness, has been on feet a lot today.     Objective   Left knee AAROM: 95 deg flexion     See Exercise, Manual, and Modality Logs for complete treatment.       Assessment/Plan  Patient tolerated exercises well, increased swelling today. Will continue working on knee mobility and progressive strengthening.   Progress per Plan of Care           Manual Therapy:         mins  03330;  Therapeutic Exercise:    42     mins  11274;     Neuromuscular Shaniqua:        mins  36541;    Therapeutic Activity:          mins  32892;     Gait Trainin     mins  58146;     Ultrasound:          mins  59982;   Iontophoresis         mins  97511   Electrical Stimulation:         mins  73516 ( );  Dry Needling          mins self-pay  Fluidotherapy          mins 40401    Timed Treatment:   55   mins   Total Treatment:     65   mins    Yani Haley PT  Physical Therapist

## 2019-05-21 ENCOUNTER — TREATMENT (OUTPATIENT)
Dept: PHYSICAL THERAPY | Facility: CLINIC | Age: 29
End: 2019-05-21

## 2019-05-21 DIAGNOSIS — S82.142A CLOSED FRACTURE OF LEFT TIBIAL PLATEAU, INITIAL ENCOUNTER: Primary | ICD-10-CM

## 2019-05-21 DIAGNOSIS — S83.512A RUPTURE OF ANTERIOR CRUCIATE LIGAMENT OF LEFT KNEE, INITIAL ENCOUNTER: ICD-10-CM

## 2019-05-21 DIAGNOSIS — Z98.890 S/P LATERAL MENISCECTOMY OF LEFT KNEE: ICD-10-CM

## 2019-05-21 PROCEDURE — 97116 GAIT TRAINING THERAPY: CPT | Performed by: PHYSICAL THERAPIST

## 2019-05-21 PROCEDURE — 97110 THERAPEUTIC EXERCISES: CPT | Performed by: PHYSICAL THERAPIST

## 2019-05-21 NOTE — PROGRESS NOTES
Subjective   Sandor Cordon reports: Knee sore today but not too bad.     Objective   Left knee AAROM: 105 deg     See Exercise, Manual, and Modality Logs for complete treatment.       Assessment/Plan  Patient progressing well with exercises. Knee mobility improving. Will start SL activity next visit.   Progress per Plan of Care           Manual Therapy:         mins  35460;  Therapeutic Exercise:    43     mins  03320;     Neuromuscular Shaniqua:        mins  90090;    Therapeutic Activity:          mins  66860;     Gait Training:      10     mins  52229;     Ultrasound:          mins  07267;   Iontophoresis          mins  80731   Electrical Stimulation:         mins  93390 ( );  Dry Needling          mins self-pay  Fluidotherapy          mins 47909    Timed Treatment:   53   mins   Total Treatment:     63   mins    Yani Haley PT  Physical Therapist

## 2019-05-24 ENCOUNTER — TREATMENT (OUTPATIENT)
Dept: PHYSICAL THERAPY | Facility: CLINIC | Age: 29
End: 2019-05-24

## 2019-05-24 PROCEDURE — 97116 GAIT TRAINING THERAPY: CPT | Performed by: PHYSICAL THERAPIST

## 2019-05-24 PROCEDURE — 97110 THERAPEUTIC EXERCISES: CPT | Performed by: PHYSICAL THERAPIST

## 2019-05-24 PROCEDURE — 97530 THERAPEUTIC ACTIVITIES: CPT | Performed by: PHYSICAL THERAPIST

## 2019-05-24 NOTE — PROGRESS NOTES
Subjective   Sandor Cordon reports: Knee sore and a little swollen feeling, has been on it a lot today. Feels like he is carrying his crutch around at this point. Knee has felt stable.     Objective   Left knee AAROM: 100 deg flexion    SLS: pt able to balance 30 sec on unstable surface with mod postural sway    Gait: delayed knee flexion in mid-terminal stance     See Exercise, Manual, and Modality Logs for complete treatment.       Assessment/Plan  Patient progressing well with strengthening. Began single leg activity without pain. Had discussion of need for ACL repair. Patient does no running with quick stops or quick cutting. Patient is showing great improvement with knee stability without pain. Will continue to work on knee stability and mobility.   Progress per Plan of Care           Manual Therapy:         mins  72192;  Therapeutic Exercise:    35     mins  13205;     Neuromuscular Shaniqua:       mins  64546;    Therapeutic Activity:     10     mins  14407;     Gait Training:      10     mins  50910;     Ultrasound:          mins  35640;   Iontophoresis          mins  55700   Electrical Stimulation:         mins  99684 ( );  Dry Needling          mins self-pay  Fluidotherapy          mins 09957    Timed Treatment:  55    mins   Total Treatment:    65    mins    Yani Haley PT  Physical Therapist

## 2019-05-28 ENCOUNTER — TREATMENT (OUTPATIENT)
Dept: PHYSICAL THERAPY | Facility: CLINIC | Age: 29
End: 2019-05-28

## 2019-05-28 DIAGNOSIS — Z98.890 S/P LATERAL MENISCECTOMY OF LEFT KNEE: ICD-10-CM

## 2019-05-28 DIAGNOSIS — S82.142A CLOSED FRACTURE OF LEFT TIBIAL PLATEAU, INITIAL ENCOUNTER: Primary | ICD-10-CM

## 2019-05-28 DIAGNOSIS — S83.512A RUPTURE OF ANTERIOR CRUCIATE LIGAMENT OF LEFT KNEE, INITIAL ENCOUNTER: ICD-10-CM

## 2019-05-28 PROCEDURE — 97140 MANUAL THERAPY 1/> REGIONS: CPT | Performed by: PHYSICAL THERAPIST

## 2019-05-28 PROCEDURE — 97116 GAIT TRAINING THERAPY: CPT | Performed by: PHYSICAL THERAPIST

## 2019-05-28 PROCEDURE — 97110 THERAPEUTIC EXERCISES: CPT | Performed by: PHYSICAL THERAPIST

## 2019-05-28 NOTE — PROGRESS NOTES
Subjective   Sandor Cordon reports: No pain at rest. Has not had any issues walking at home without crutch.     Objective   Knee AAROM: 102 deg     See Exercise, Manual, and Modality Logs for complete treatment.       Assessment/Plan  Patient progressing well with strengthening, will focus more on passive and active knee mobility.   Progress per Plan of Care           Manual Therapy:    13     mins  83740;  Therapeutic Exercise:    32     mins  10940;     Neuromuscular Shaniqua:        mins  65714;    Therapeutic Activity:          mins  38959;     Gait Training:      10     mins  57467;     Ultrasound:          mins  16719;   Iontophoresis          mins  02958   Electrical Stimulation:         mins  27695 ( );  Dry Needling          mins self-pay  Fluidotherapy          mins 97599    Timed Treatment:   55   mins   Total Treatment:     65   mins    Yani Haley PT  Physical Therapist

## 2019-05-31 ENCOUNTER — TREATMENT (OUTPATIENT)
Dept: PHYSICAL THERAPY | Facility: CLINIC | Age: 29
End: 2019-05-31

## 2019-05-31 DIAGNOSIS — S83.512A RUPTURE OF ANTERIOR CRUCIATE LIGAMENT OF LEFT KNEE, INITIAL ENCOUNTER: ICD-10-CM

## 2019-05-31 DIAGNOSIS — Z98.890 S/P LATERAL MENISCECTOMY OF LEFT KNEE: ICD-10-CM

## 2019-05-31 DIAGNOSIS — S82.142A CLOSED FRACTURE OF LEFT TIBIAL PLATEAU, INITIAL ENCOUNTER: Primary | ICD-10-CM

## 2019-05-31 PROCEDURE — 97116 GAIT TRAINING THERAPY: CPT | Performed by: PHYSICAL THERAPIST

## 2019-05-31 PROCEDURE — 97140 MANUAL THERAPY 1/> REGIONS: CPT | Performed by: PHYSICAL THERAPIST

## 2019-05-31 PROCEDURE — 97110 THERAPEUTIC EXERCISES: CPT | Performed by: PHYSICAL THERAPIST

## 2019-05-31 NOTE — PROGRESS NOTES
Subjective   Sandor Cordon reports: No pain at rest. Was a little painful earlier today at work, about a 3/10    Objective   Knee AAROM: 108 deg flexion       See Exercise, Manual, and Modality Logs for complete treatment.       Assessment/Plan  Patient tolerated exercises well. Progressing with knee mobility slowly. Patient educated on additional knee flexion stretch at home.   Progress strengthening /stabilization /functional activity           Manual Therapy:    10     mins  68508;  Therapeutic Exercise:    34     mins  19953;     Neuromuscular Shaniqua:        mins  45859;    Therapeutic Activity:          mins  00794;     Gait Training:      10     mins  89018;     Ultrasound:          mins  41988;   Iontophoresis          mins  59158   Electrical Stimulation:         mins  88738 ( );  Dry Needling          mins self-pay  Fluidotherapy          mins 42131    Timed Treatment:  54    mins   Total Treatment:     64   mins    Yani Haley PT  Physical Therapist

## 2019-06-04 ENCOUNTER — TREATMENT (OUTPATIENT)
Dept: PHYSICAL THERAPY | Facility: CLINIC | Age: 29
End: 2019-06-04

## 2019-06-04 DIAGNOSIS — S83.512A RUPTURE OF ANTERIOR CRUCIATE LIGAMENT OF LEFT KNEE, INITIAL ENCOUNTER: ICD-10-CM

## 2019-06-04 DIAGNOSIS — Z98.890 S/P LATERAL MENISCECTOMY OF LEFT KNEE: ICD-10-CM

## 2019-06-04 DIAGNOSIS — S82.142A CLOSED FRACTURE OF LEFT TIBIAL PLATEAU, INITIAL ENCOUNTER: Primary | ICD-10-CM

## 2019-06-04 PROCEDURE — 97140 MANUAL THERAPY 1/> REGIONS: CPT | Performed by: PHYSICAL THERAPIST

## 2019-06-04 PROCEDURE — 97110 THERAPEUTIC EXERCISES: CPT | Performed by: PHYSICAL THERAPIST

## 2019-06-04 PROCEDURE — 97116 GAIT TRAINING THERAPY: CPT | Performed by: PHYSICAL THERAPIST

## 2019-06-04 NOTE — PROGRESS NOTES
Subjective   Sandor Cordon reports: No pain at rest     Objective   Knee AAROM: 105 deg flexion     See Exercise, Manual, and Modality Logs for complete treatment.       Assessment/Plan  Utilizing more manual therapy as well as body weight stretches. Will continue working on knee flexion.   Progress per Plan of Care           Manual Therapy:    14     mins  17059;  Therapeutic Exercise:    33     mins  44743;     Neuromuscular Shaniqua:        mins  62178;    Therapeutic Activity:          mins  40290;     Gait Training:      10     mins  75617;     Ultrasound:          mins  71036;   Iontophoresis          mins  35391   Electrical Stimulation:         mins  52690 ( );  Dry Needling          mins self-pay  Fluidotherapy          mins 53707    Timed Treatment:   57   mins   Total Treatment:     57   mins    Yani Haley PT  Physical Therapist

## 2019-06-07 ENCOUNTER — TREATMENT (OUTPATIENT)
Dept: PHYSICAL THERAPY | Facility: CLINIC | Age: 29
End: 2019-06-07

## 2019-06-07 DIAGNOSIS — Z98.890 S/P LATERAL MENISCECTOMY OF LEFT KNEE: ICD-10-CM

## 2019-06-07 DIAGNOSIS — S82.142A CLOSED FRACTURE OF LEFT TIBIAL PLATEAU, INITIAL ENCOUNTER: Primary | ICD-10-CM

## 2019-06-07 DIAGNOSIS — S83.512A RUPTURE OF ANTERIOR CRUCIATE LIGAMENT OF LEFT KNEE, INITIAL ENCOUNTER: ICD-10-CM

## 2019-06-07 PROCEDURE — 97110 THERAPEUTIC EXERCISES: CPT | Performed by: PHYSICAL THERAPIST

## 2019-06-07 PROCEDURE — 97140 MANUAL THERAPY 1/> REGIONS: CPT | Performed by: PHYSICAL THERAPIST

## 2019-06-07 PROCEDURE — 97116 GAIT TRAINING THERAPY: CPT | Performed by: PHYSICAL THERAPIST

## 2019-06-07 NOTE — PROGRESS NOTES
Subjective   Sandor Cordon reports: No pain at rest. Some soreness in knee.     Objective   Knee AAROM: 115 deg flexion     See Exercise, Manual, and Modality Logs for complete treatment.       Assessment/Plan  Patient states knee is usually more flexible in the morning and gets more stiff as day progresses. Patient reached highest flexion measurement to date.   Progress per Plan of Care           Manual Therapy:    13     mins  02469;  Therapeutic Exercise:    34     mins  94480;     Neuromuscular Shaniqua:        mins  86907;    Therapeutic Activity:          mins  97267;     Gait Training:      10     mins  48903;     Ultrasound:          mins  75455;   Iontophoresis          mins  13523   Electrical Stimulation:         mins  17476 ( );  Dry Needling          mins self-pay  Fluidotherapy          mins 52150    Timed Treatment:   57   mins   Total Treatment:     57   mins    Yani Haley PT  Physical Therapist

## 2019-06-14 ENCOUNTER — TREATMENT (OUTPATIENT)
Dept: PHYSICAL THERAPY | Facility: CLINIC | Age: 29
End: 2019-06-14

## 2019-06-14 DIAGNOSIS — Z98.890 S/P LATERAL MENISCECTOMY OF LEFT KNEE: ICD-10-CM

## 2019-06-14 DIAGNOSIS — S82.142A CLOSED FRACTURE OF LEFT TIBIAL PLATEAU, INITIAL ENCOUNTER: Primary | ICD-10-CM

## 2019-06-14 DIAGNOSIS — S83.512A RUPTURE OF ANTERIOR CRUCIATE LIGAMENT OF LEFT KNEE, INITIAL ENCOUNTER: ICD-10-CM

## 2019-06-14 PROCEDURE — 97116 GAIT TRAINING THERAPY: CPT | Performed by: PHYSICAL THERAPIST

## 2019-06-14 PROCEDURE — 97140 MANUAL THERAPY 1/> REGIONS: CPT | Performed by: PHYSICAL THERAPIST

## 2019-06-14 PROCEDURE — 97110 THERAPEUTIC EXERCISES: CPT | Performed by: PHYSICAL THERAPIST

## 2019-06-14 NOTE — PROGRESS NOTES
Subjective   Sandor Cordon reports: 6-7/10 pain at rest. Has been painful for past several days, did slip on carpet and no sure if he tweaked knee then. Has had to take breaks at work every 2 hours where prior to two days ago was able to get through full shift with little to no pain.     Objective   Knee AAROM: 115 deg flex     See Exercise, Manual, and Modality Logs for complete treatment.       Assessment/Plan   Pt advised to ice knee several times per day if possible and rest knee as able. Will monitor for improvement, patient likely sprained mildly.   Progress per Plan of Care           Manual Therapy:    10     mins  22996;  Therapeutic Exercise:    34     mins  43482;     Neuromuscular Shaniqua:        mins  38800;    Therapeutic Activity:          mins  51723;     Gait Trainin     mins  60478;     Ultrasound:          mins  20992;   Iontophoresis          mins  37233   Electrical Stimulation:         mins  61919 ( );  Dry Needling          mins self-pay  Fluidotherapy          mins 12379    Timed Treatment:   55   mins   Total Treatment:     65   mins    Yani Haley, PT  Physical Therapist

## 2019-06-21 ENCOUNTER — TREATMENT (OUTPATIENT)
Dept: PHYSICAL THERAPY | Facility: CLINIC | Age: 29
End: 2019-06-21

## 2019-06-21 DIAGNOSIS — S82.142A CLOSED FRACTURE OF LEFT TIBIAL PLATEAU, INITIAL ENCOUNTER: Primary | ICD-10-CM

## 2019-06-21 DIAGNOSIS — Z98.890 S/P LATERAL MENISCECTOMY OF LEFT KNEE: ICD-10-CM

## 2019-06-21 DIAGNOSIS — S83.512A RUPTURE OF ANTERIOR CRUCIATE LIGAMENT OF LEFT KNEE, INITIAL ENCOUNTER: ICD-10-CM

## 2019-06-21 PROCEDURE — 97116 GAIT TRAINING THERAPY: CPT | Performed by: PHYSICAL THERAPIST

## 2019-06-21 PROCEDURE — 97110 THERAPEUTIC EXERCISES: CPT | Performed by: PHYSICAL THERAPIST

## 2019-06-21 PROCEDURE — 97530 THERAPEUTIC ACTIVITIES: CPT | Performed by: PHYSICAL THERAPIST

## 2019-06-21 NOTE — PROGRESS NOTES
Subjective   Sandor Cordon reports: No pain at rest. States he has still been fearful of taking ladder but feels like he can do it. States functionally he has been able to do everything.     Objective   Knee AAROM: 120 deg and non painful     See Exercise, Manual, and Modality Logs for complete treatment.       Assessment/Plan  Patient progressing well with knee mobility. Tolerated low level impact (hopping) without discomfort.  Will continue progressing mobility and conditioning.   Progress per Plan of Care           Manual Therapy:         mins  20749;  Therapeutic Exercise:    36     mins  29462;     Neuromuscular Shaniqua:        mins  23551;    Therapeutic Activity:     10     mins  72884;     Gait Trainin     mins  32869;     Ultrasound:          mins  95619;   Iontophoresis          mins  53976   Electrical Stimulation:         mins  64753 ( );  Dry Needling          mins self-pay  Fluidotherapy          mins 06364    Timed Treatment:   58   mins   Total Treatment:     58   mins    Yani Haley, JARRED  Physical Therapist

## 2019-06-25 ENCOUNTER — TREATMENT (OUTPATIENT)
Dept: PHYSICAL THERAPY | Facility: CLINIC | Age: 29
End: 2019-06-25

## 2019-06-25 DIAGNOSIS — S83.512A RUPTURE OF ANTERIOR CRUCIATE LIGAMENT OF LEFT KNEE, INITIAL ENCOUNTER: ICD-10-CM

## 2019-06-25 DIAGNOSIS — Z98.890 S/P LATERAL MENISCECTOMY OF LEFT KNEE: ICD-10-CM

## 2019-06-25 DIAGNOSIS — S82.142A CLOSED FRACTURE OF LEFT TIBIAL PLATEAU, INITIAL ENCOUNTER: Primary | ICD-10-CM

## 2019-06-25 PROCEDURE — 97110 THERAPEUTIC EXERCISES: CPT | Performed by: PHYSICAL THERAPIST

## 2019-06-25 PROCEDURE — 97530 THERAPEUTIC ACTIVITIES: CPT | Performed by: PHYSICAL THERAPIST

## 2019-06-25 PROCEDURE — 97116 GAIT TRAINING THERAPY: CPT | Performed by: PHYSICAL THERAPIST

## 2019-06-25 NOTE — PROGRESS NOTES
Subjective   Sandor Cordon reports: No pain at rest.     Objective   Knee AAROM: 120 deg     See Exercise, Manual, and Modality Logs for complete treatment.       Assessment/Plan  Patient tolerating progressive dynamic balance and low impact conditioning. Will continue working on knee mobility.   Progress per Plan of Care           Manual Therapy:         mins  74150;  Therapeutic Exercise:    35     mins  40957;     Neuromuscular Shaniqua:        mins  86938;    Therapeutic Activity:     11     mins  45515;     Gait Training:      10     mins  31531;     Ultrasound:          mins  35726;   Iontophoresis          mins  18058   Electrical Stimulation:         mins  81317 ( );  Dry Needling          mins self-pay  Fluidotherapy          mins 48995    Timed Treatment:   56   mins   Total Treatment:     56   mins    Yani Haley PT  Physical Therapist

## 2019-06-28 ENCOUNTER — TREATMENT (OUTPATIENT)
Dept: PHYSICAL THERAPY | Facility: CLINIC | Age: 29
End: 2019-06-28

## 2019-06-28 DIAGNOSIS — S82.142A CLOSED FRACTURE OF LEFT TIBIAL PLATEAU, INITIAL ENCOUNTER: Primary | ICD-10-CM

## 2019-06-28 DIAGNOSIS — Z98.890 S/P LATERAL MENISCECTOMY OF LEFT KNEE: ICD-10-CM

## 2019-06-28 DIAGNOSIS — S83.512A RUPTURE OF ANTERIOR CRUCIATE LIGAMENT OF LEFT KNEE, INITIAL ENCOUNTER: ICD-10-CM

## 2019-06-28 PROCEDURE — 97530 THERAPEUTIC ACTIVITIES: CPT | Performed by: PHYSICAL THERAPIST

## 2019-06-28 PROCEDURE — 97110 THERAPEUTIC EXERCISES: CPT | Performed by: PHYSICAL THERAPIST

## 2019-06-28 PROCEDURE — 97116 GAIT TRAINING THERAPY: CPT | Performed by: PHYSICAL THERAPIST

## 2019-06-28 NOTE — PROGRESS NOTES
Subjective   Sandor Cordon reports: No pain at rest. States he did go on some ladders at work yesterday and had no issues with knee.     Objective   Knee AAROM: 123 deg flex     See Exercise, Manual, and Modality Logs for complete treatment.       Assessment/Plan  Will reassess next week for progress. Stability and function of knee is WFL, we may have hit a plateau with knee mobility at this time.   Progress per Plan of Care           Manual Therapy:         mins  49557;  Therapeutic Exercise:    34     mins  90548;     Neuromuscular Shaniqua:        mins  33494;    Therapeutic Activity:     11     mins  10372;     Gait Training:      10     mins  41932;     Ultrasound:          mins  73364;   Iontophoresis          mins  84339   Electrical Stimulation:         mins  63469 ( );  Dry Needling          mins self-pay  Fluidotherapy          mins 42136    Timed Treatment:   55   mins   Total Treatment:     55   mins    Yani Haley, PT  Physical Therapist

## 2019-07-02 ENCOUNTER — TREATMENT (OUTPATIENT)
Dept: PHYSICAL THERAPY | Facility: CLINIC | Age: 29
End: 2019-07-02

## 2019-07-02 DIAGNOSIS — S82.142A CLOSED FRACTURE OF LEFT TIBIAL PLATEAU, INITIAL ENCOUNTER: Primary | ICD-10-CM

## 2019-07-02 DIAGNOSIS — S83.512A RUPTURE OF ANTERIOR CRUCIATE LIGAMENT OF LEFT KNEE, INITIAL ENCOUNTER: ICD-10-CM

## 2019-07-02 DIAGNOSIS — Z98.890 S/P LATERAL MENISCECTOMY OF LEFT KNEE: ICD-10-CM

## 2019-07-02 PROCEDURE — 97530 THERAPEUTIC ACTIVITIES: CPT | Performed by: PHYSICAL THERAPIST

## 2019-07-02 PROCEDURE — 97110 THERAPEUTIC EXERCISES: CPT | Performed by: PHYSICAL THERAPIST

## 2019-07-02 PROCEDURE — 97116 GAIT TRAINING THERAPY: CPT | Performed by: PHYSICAL THERAPIST

## 2019-07-02 NOTE — PROGRESS NOTES
Subjective   Sandor Cordon reports: No pain at rest. Has had no issues with work. Does get the occasional popping in knee but it is not painful. States the knee does get fatigued after long day of work or activity and he feels like he needs to take stairs one at a time.     Objective     Left knee AAROM: 120 deg flexion  Right knee AAROM: 128 deg flexion    Patient able to fully squat x 10 without knee pain, there is audible popping which is pain free.      Able to perform static lunge without knee pain.     Left knee strength: grossly 5/5        See Exercise, Manual, and Modality Logs for complete treatment.       Assessment/Plan  Patient demonstrates normal knee strength and knee mobility has improved overall but may have hit a plateau at this time. Discussed continuing HEP and return 2 weeks from now to assess any improvement in endurance with normal activity.   Progress per Plan of Care           Manual Therapy:         mins  96709;  Therapeutic Exercise:    35     mins  59216;     Neuromuscular Shaniqua:        mins  51846;    Therapeutic Activity:     11     mins  80039;     Gait Training:      10     mins  39495;     Ultrasound:          mins  76376;   Iontophoresis          mins  09446   Electrical Stimulation:         mins  11276 ( );  Dry Needling          mins self-pay  Fluidotherapy          mins 58473    Timed Treatment:   56   mins   Total Treatment:     56   mins    Yani Haley PT  Physical Therapist

## 2019-07-25 ENCOUNTER — TREATMENT (OUTPATIENT)
Dept: PHYSICAL THERAPY | Facility: CLINIC | Age: 29
End: 2019-07-25

## 2019-07-25 DIAGNOSIS — S82.142A CLOSED FRACTURE OF LEFT TIBIAL PLATEAU, INITIAL ENCOUNTER: Primary | ICD-10-CM

## 2019-07-25 DIAGNOSIS — S83.512A RUPTURE OF ANTERIOR CRUCIATE LIGAMENT OF LEFT KNEE, INITIAL ENCOUNTER: ICD-10-CM

## 2019-07-25 DIAGNOSIS — Z98.890 S/P LATERAL MENISCECTOMY OF LEFT KNEE: ICD-10-CM

## 2019-07-25 PROCEDURE — 97530 THERAPEUTIC ACTIVITIES: CPT | Performed by: PHYSICAL THERAPIST

## 2019-07-25 PROCEDURE — 97110 THERAPEUTIC EXERCISES: CPT | Performed by: PHYSICAL THERAPIST

## 2019-07-25 NOTE — PROGRESS NOTES
Discharge Note     Subjective   Sandor Cordon reports: No pain in knee. Still feels tight sometimes but has been able to do all work and home activities without issues.     Objective   L knee AAROM: 123 deg flexion    Full squat x10 without discomfort    SLS: 30 sec with good knee stability.     Static lunge WFL without discomfort    Gross knee strength 5/5    See Exercise, Manual, and Modality Logs for complete treatment.       Assessment/Plan  Patient has made good progress with knee mobility and strengthening. Discussed continuing HEP, particularly stretches.   Other  Patient discharged at this time with all goals met.          Manual Therapy:         mins  06077;  Therapeutic Exercise:    31     mins  96009;     Neuromuscular Shaniqua:        mins  24556;    Therapeutic Activity:     10     mins  36759;     Gait Training:           mins  00606;     Ultrasound:          mins  50689;   Iontophoresis          mins  15677   Electrical Stimulation:         mins  37942 ( );  Dry Needling          mins self-pay  Fluidotherapy          mins 64776    Timed Treatment:   41   mins   Total Treatment:     41   mins    Yani Haley PT  Physical Therapist

## 2019-07-26 ENCOUNTER — PREP FOR SURGERY (OUTPATIENT)
Dept: OTHER | Facility: HOSPITAL | Age: 29
End: 2019-07-26

## 2019-07-26 DIAGNOSIS — S83.512A RUPTURE OF ANTERIOR CRUCIATE LIGAMENT OF LEFT KNEE, INITIAL ENCOUNTER: Primary | ICD-10-CM

## 2019-07-30 PROBLEM — S83.512A LEFT ANTERIOR CRUCIATE LIGAMENT TEAR: Status: ACTIVE | Noted: 2019-07-30

## 2019-07-30 RX ORDER — CEFAZOLIN SODIUM 2 G/50ML
2 SOLUTION INTRAVENOUS
Status: CANCELLED | OUTPATIENT
Start: 2019-07-31 | End: 2019-08-01

## 2019-08-15 ENCOUNTER — OFFICE VISIT (OUTPATIENT)
Dept: ORTHOPEDIC SURGERY | Facility: CLINIC | Age: 29
End: 2019-08-15

## 2019-08-15 ENCOUNTER — APPOINTMENT (OUTPATIENT)
Dept: PREADMISSION TESTING | Facility: HOSPITAL | Age: 29
End: 2019-08-15

## 2019-08-15 ENCOUNTER — HOSPITAL ENCOUNTER (OUTPATIENT)
Dept: GENERAL RADIOLOGY | Facility: HOSPITAL | Age: 29
Discharge: HOME | End: 2019-08-15
Admitting: PHYSICIAN ASSISTANT

## 2019-08-15 VITALS — HEIGHT: 70 IN | WEIGHT: 311 LBS | BODY MASS INDEX: 44.52 KG/M2

## 2019-08-15 DIAGNOSIS — S83.512A RUPTURE OF ANTERIOR CRUCIATE LIGAMENT OF LEFT KNEE, INITIAL ENCOUNTER: ICD-10-CM

## 2019-08-15 DIAGNOSIS — S82.142D CLOSED FRACTURE OF LEFT TIBIAL PLATEAU WITH ROUTINE HEALING, SUBSEQUENT ENCOUNTER: Primary | ICD-10-CM

## 2019-08-15 LAB
ALBUMIN SERPL-MCNC: 4.5 G/DL (ref 3.5–5.2)
ALBUMIN/GLOB SERPL: 1.6 G/DL
ALP SERPL-CCNC: 111 U/L (ref 39–117)
ALT SERPL W P-5'-P-CCNC: 19 U/L (ref 1–41)
ANION GAP SERPL CALCULATED.3IONS-SCNC: 11.5 MMOL/L (ref 5–15)
AST SERPL-CCNC: 16 U/L (ref 1–40)
BASOPHILS # BLD AUTO: 0.09 10*3/MM3 (ref 0–0.2)
BASOPHILS NFR BLD AUTO: 0.8 % (ref 0–1.5)
BILIRUB SERPL-MCNC: 0.4 MG/DL (ref 0.2–1.2)
BILIRUB UR QL STRIP: NEGATIVE
BUN BLD-MCNC: 11 MG/DL (ref 6–20)
BUN/CREAT SERPL: 14.7 (ref 7–25)
CALCIUM SPEC-SCNC: 9.4 MG/DL (ref 8.6–10.5)
CHLORIDE SERPL-SCNC: 102 MMOL/L (ref 98–107)
CLARITY UR: ABNORMAL
CO2 SERPL-SCNC: 26.5 MMOL/L (ref 22–29)
COLOR UR: YELLOW
CREAT BLD-MCNC: 0.75 MG/DL (ref 0.76–1.27)
DEPRECATED RDW RBC AUTO: 45.6 FL (ref 37–54)
EOSINOPHIL # BLD AUTO: 0.3 10*3/MM3 (ref 0–0.4)
EOSINOPHIL NFR BLD AUTO: 2.8 % (ref 0.3–6.2)
ERYTHROCYTE [DISTWIDTH] IN BLOOD BY AUTOMATED COUNT: 14.2 % (ref 12.3–15.4)
GFR SERPL CREATININE-BSD FRML MDRD: 122 ML/MIN/1.73
GLOBULIN UR ELPH-MCNC: 2.8 GM/DL
GLUCOSE BLD-MCNC: 90 MG/DL (ref 65–99)
GLUCOSE UR STRIP-MCNC: NEGATIVE MG/DL
HCT VFR BLD AUTO: 48.1 % (ref 37.5–51)
HGB BLD-MCNC: 15.8 G/DL (ref 13–17.7)
HGB UR QL STRIP.AUTO: NEGATIVE
IMM GRANULOCYTES # BLD AUTO: 0.03 10*3/MM3 (ref 0–0.05)
IMM GRANULOCYTES NFR BLD AUTO: 0.3 % (ref 0–0.5)
KETONES UR QL STRIP: NEGATIVE
LEUKOCYTE ESTERASE UR QL STRIP.AUTO: NEGATIVE
LYMPHOCYTES # BLD AUTO: 2.89 10*3/MM3 (ref 0.7–3.1)
LYMPHOCYTES NFR BLD AUTO: 26.9 % (ref 19.6–45.3)
MCH RBC QN AUTO: 29.1 PG (ref 26.6–33)
MCHC RBC AUTO-ENTMCNC: 32.8 G/DL (ref 31.5–35.7)
MCV RBC AUTO: 88.6 FL (ref 79–97)
MONOCYTES # BLD AUTO: 0.75 10*3/MM3 (ref 0.1–0.9)
MONOCYTES NFR BLD AUTO: 7 % (ref 5–12)
NEUTROPHILS # BLD AUTO: 6.67 10*3/MM3 (ref 1.7–7)
NEUTROPHILS NFR BLD AUTO: 62.2 % (ref 42.7–76)
NITRITE UR QL STRIP: NEGATIVE
NRBC BLD AUTO-RTO: 0 /100 WBC (ref 0–0.2)
PH UR STRIP.AUTO: 6 [PH] (ref 5–8)
PLATELET # BLD AUTO: 293 10*3/MM3 (ref 140–450)
PMV BLD AUTO: 9 FL (ref 6–12)
POTASSIUM BLD-SCNC: 3.9 MMOL/L (ref 3.5–5.2)
PROT SERPL-MCNC: 7.3 G/DL (ref 6–8.5)
PROT UR QL STRIP: NEGATIVE
RBC # BLD AUTO: 5.43 10*6/MM3 (ref 4.14–5.8)
SODIUM BLD-SCNC: 140 MMOL/L (ref 136–145)
SP GR UR STRIP: 1.02 (ref 1–1.03)
UROBILINOGEN UR QL STRIP: ABNORMAL
WBC NRBC COR # BLD: 10.73 10*3/MM3 (ref 3.4–10.8)

## 2019-08-15 PROCEDURE — 71046 X-RAY EXAM CHEST 2 VIEWS: CPT

## 2019-08-15 PROCEDURE — S0260 H&P FOR SURGERY: HCPCS | Performed by: PHYSICIAN ASSISTANT

## 2019-08-15 PROCEDURE — 36415 COLL VENOUS BLD VENIPUNCTURE: CPT

## 2019-08-15 PROCEDURE — 99212 OFFICE O/P EST SF 10 MIN: CPT | Performed by: PHYSICIAN ASSISTANT

## 2019-08-15 PROCEDURE — 80053 COMPREHEN METABOLIC PANEL: CPT | Performed by: PHYSICIAN ASSISTANT

## 2019-08-15 PROCEDURE — 85025 COMPLETE CBC W/AUTO DIFF WBC: CPT | Performed by: PHYSICIAN ASSISTANT

## 2019-08-15 PROCEDURE — 81003 URINALYSIS AUTO W/O SCOPE: CPT | Performed by: PHYSICIAN ASSISTANT

## 2019-08-15 PROCEDURE — 87081 CULTURE SCREEN ONLY: CPT | Performed by: PHYSICIAN ASSISTANT

## 2019-08-15 RX ORDER — LOSARTAN POTASSIUM 50 MG/1
75 TABLET ORAL DAILY
COMMUNITY
End: 2019-11-12

## 2019-08-15 NOTE — PROGRESS NOTES
Subjective   Patient ID: Sandor Cordon is a 29 y.o. right hand dominant male  Pre-op Exam of the Left Knee (Surgery scheduled 8/29/19)         History of Present Illness    Patient presents for a pre op exam for a planned left knee ats with acl recon.   Patient initially suffered a left tibial plateau fracture which required a fixation using 2 compression screws.  He was noted at that time to have an ACL rupture however we decided to allow the plateau fracture to heal properly before proceeding with ACL reconstruction.  He has already planned for Allograft after researching the benefits and risks of ALLograft            Past Medical History:   Diagnosis Date   • Alcohol use    • Current every day smoker    • Hypertension    • Morbid obesity with BMI of 40.0-44.9, adult (CMS/[facility])    • Risk factors for obstructive sleep apnea    • Tibial plateau fracture, left 2019        Past Surgical History:   Procedure Laterality Date   • KNEE ARTHROSCOPY Left 4/9/2019    Procedure: knee diagnostic arthroscopy left, reduction of tibial plateau fracture, fixation with two compression screws, assessment of ACL rupture, partial lateral meniscectomy, lateral chondroplasty;  Surgeon: Wes Maradiaga MD;  Location: [facility];  Service: Orthopedics       History reviewed. No pertinent family history.    Social History     Socioeconomic History   • Marital status: Single     Spouse name: Not on file   • Number of children: Not on file   • Years of education: Not on file   • Highest education level: Not on file   Tobacco Use   • Smoking status: Current Every Day Smoker     Packs/day: 1.00     Years: 13.00     Pack years: 13.00     Types: Cigarettes   • Smokeless tobacco: Never Used   Substance and Sexual Activity   • Alcohol use: Yes     Comment: 1 case of beer/weekly   • Drug use: No   • Sexual activity: Defer       I have reviewed all of the above social hx, family hx, surgical hx, medications, allergies & ROS and confirm that  it is accurate.      Current Outpatient Medications:   •  losartan (COZAAR) 50 MG tablet, Take 75 mg by mouth Daily., Disp: , Rfl:     No Known Allergies    Review of Systems   Constitutional: Negative for diaphoresis, fever and unexpected weight change.   HENT: Negative for dental problem and sore throat.    Eyes: Negative for visual disturbance.   Respiratory: Negative for shortness of breath.    Cardiovascular: Negative for chest pain.   Gastrointestinal: Negative for abdominal pain, constipation, diarrhea, nausea and vomiting.   Genitourinary: Negative for difficulty urinating and frequency.   Musculoskeletal: Positive for arthralgias.   Neurological: Negative for headaches.   Hematological: Does not bruise/bleed easily.       Objective   There were no vitals taken for this visit.   Physical Exam   Constitutional: He is oriented to person, place, and time. He appears well-developed.   Eyes: Conjunctivae are normal.   Neck: No tracheal deviation present.   Cardiovascular: Normal rate and regular rhythm.   Pulmonary/Chest: Effort normal.   Abdominal: Soft. He exhibits no distension.   Musculoskeletal:        Left knee: He exhibits no swelling, no effusion, no ecchymosis, no deformity and no erythema. No tenderness found. No medial joint line and no lateral joint line tenderness noted.   Neurological: He is alert and oriented to person, place, and time.   Psychiatric: He has a normal mood and affect.   Nursing note and vitals reviewed.    Left Knee Exam     Range of Motion   Extension: 5   Flexion: 100     Tests   Drawer:  Anterior - 1+         Other   Erythema: absent  Scars: present  Effusion: no effusion present           Extremity DVT signs are Negative on physical exam with negative Nicolas sign, with no calf pain, with no palpable cords, with no increased pain with passive stretch/extension and with no skin tone change   Neurologic Exam     Mental Status   Oriented to person, place, and time.                 Assessment/Plan         Procedures       Sandor was seen today for pre-op exam.    Diagnoses and all orders for this visit:    Closed fracture of left tibial plateau with routine healing, subsequent encounter    Rupture of anterior cruciate ligament of left knee, initial encounter       Discussion of orthopedic goals  Risk, benefits, and merits of treatment alternatives reviewed with the patient and questions answered  The nature of the proposed surgery reviewed with the patient including risks, benefits, rehabilitation, recovery timeframe, and outcome expectations    Recommendations/Plan:  Exercise, medications, injections, other patient advice, and return appointment as noted.  Patient is encouraged to call or return for any issues or concerns.      Risks, benefits, and alternative treatments discussed with the patient: [x] Yes [] No    Risk benefits and merits of the proposed surgery were discussed and the patient's questions were answered risks discussed including and not limited to:  Anesthesia reactions  Blood loss and possible transfusion  Infection  Deep venous thrombosis and pulmonary embolus  Nerve, vascular or tendon injury  Fracture  Deformity  Stiffness  Weakness  Skin necrosis  Revision surgery or further treatment  Recurrence of problem and condition     Informed consent: [] Signed  [x] To be obtained at hospital  [] Both      PLAN: Left knee ATS with ACL reconstruction using Allograft donor tissue and related procedures      Patient agreeable to call or return sooner for any concerns.           Wacai Dragon-transcription disclaimer:  This encounter note is an electronic transcription of spoken language to printed text.  Electronic transcription of spoken language may permit erroneous or at times nonsensical words or phrases to be inadvertently transcribed.  Although I have reviewed the note for such errors, some may still exist

## 2019-08-16 LAB — MRSA SPEC QL CULT: NORMAL

## 2019-08-29 ENCOUNTER — HOSPITAL ENCOUNTER (OUTPATIENT)
Facility: HOSPITAL | Age: 29
Setting detail: HOSPITAL OUTPATIENT SURGERY
Discharge: HOME | End: 2019-08-29
Attending: ORTHOPAEDIC SURGERY | Admitting: ORTHOPAEDIC SURGERY

## 2019-08-29 ENCOUNTER — ANESTHESIA EVENT (OUTPATIENT)
Dept: PERIOP | Facility: HOSPITAL | Age: 29
End: 2019-08-29

## 2019-08-29 ENCOUNTER — ANESTHESIA (OUTPATIENT)
Dept: PERIOP | Facility: HOSPITAL | Age: 29
End: 2019-08-29

## 2019-08-29 VITALS
SYSTOLIC BLOOD PRESSURE: 123 MMHG | OXYGEN SATURATION: 97 % | DIASTOLIC BLOOD PRESSURE: 62 MMHG | TEMPERATURE: 97 F | RESPIRATION RATE: 20 BRPM | HEART RATE: 95 BPM

## 2019-08-29 PROCEDURE — 25010000002 HYDROMORPHONE PER 4 MG: Performed by: NURSE ANESTHETIST, CERTIFIED REGISTERED

## 2019-08-29 PROCEDURE — 25010000003 CEFAZOLIN SODIUM-DEXTROSE 2-3 GM-%(50ML) RECONSTITUTED SOLUTION: Performed by: PHYSICIAN ASSISTANT

## 2019-08-29 PROCEDURE — 25010000003 CEFAZOLIN PER 500 MG: Performed by: ORTHOPAEDIC SURGERY

## 2019-08-29 PROCEDURE — C1713 ANCHOR/SCREW BN/BN,TIS/BN: HCPCS | Performed by: ORTHOPAEDIC SURGERY

## 2019-08-29 PROCEDURE — 29888 ARTHRS AID ACL RPR/AGMNTJ: CPT | Performed by: ORTHOPAEDIC SURGERY

## 2019-08-29 PROCEDURE — 25010000002 PROPOFOL 200 MG/20ML EMULSION: Performed by: NURSE ANESTHETIST, CERTIFIED REGISTERED

## 2019-08-29 PROCEDURE — 25010000002 ONDANSETRON PER 1 MG: Performed by: NURSE ANESTHETIST, CERTIFIED REGISTERED

## 2019-08-29 PROCEDURE — 25010000002 HYDROMORPHONE 1 MG/ML SOLUTION

## 2019-08-29 PROCEDURE — 29881 ARTHRS KNE SRG MNISECTMY M/L: CPT | Performed by: ORTHOPAEDIC SURGERY

## 2019-08-29 PROCEDURE — 25010000002 DEXAMETHASONE PER 1 MG: Performed by: NURSE ANESTHETIST, CERTIFIED REGISTERED

## 2019-08-29 DEVICE — IMPLANTABLE DEVICE: Type: IMPLANTABLE DEVICE | Site: KNEE | Status: FUNCTIONAL

## 2019-08-29 DEVICE — GRFT PAT LIG BISC FRZ: Type: IMPLANTABLE DEVICE | Site: KNEE | Status: FUNCTIONAL

## 2019-08-29 DEVICE — SCRW CANN INTERFER SHEATH 8X25MM: Type: IMPLANTABLE DEVICE | Site: KNEE | Status: FUNCTIONAL

## 2019-08-29 RX ORDER — DEXAMETHASONE SODIUM PHOSPHATE 10 MG/ML
INJECTION, SOLUTION INTRAMUSCULAR; INTRAVENOUS
Status: DISCONTINUED
Start: 2019-08-29 | End: 2019-08-29 | Stop reason: HOSPADM

## 2019-08-29 RX ORDER — ONDANSETRON 2 MG/ML
INJECTION INTRAMUSCULAR; INTRAVENOUS AS NEEDED
Status: DISCONTINUED | OUTPATIENT
Start: 2019-08-29 | End: 2019-08-29 | Stop reason: SURG

## 2019-08-29 RX ORDER — HYDROCODONE BITARTRATE AND ACETAMINOPHEN 7.5; 325 MG/1; MG/1
1 TABLET ORAL EVERY 8 HOURS PRN
Qty: 21 TABLET | Refills: 0 | Status: SHIPPED | OUTPATIENT
Start: 2019-08-29 | End: 2019-11-12

## 2019-08-29 RX ORDER — HYDROMORPHONE HCL 110MG/55ML
PATIENT CONTROLLED ANALGESIA SYRINGE INTRAVENOUS AS NEEDED
Status: DISCONTINUED | OUTPATIENT
Start: 2019-08-29 | End: 2019-08-29 | Stop reason: SURG

## 2019-08-29 RX ORDER — PROPOFOL 10 MG/ML
INJECTION, EMULSION INTRAVENOUS AS NEEDED
Status: DISCONTINUED | OUTPATIENT
Start: 2019-08-29 | End: 2019-08-29 | Stop reason: SURG

## 2019-08-29 RX ORDER — CEFAZOLIN SODIUM 2 G/50ML
2 SOLUTION INTRAVENOUS
Status: COMPLETED | OUTPATIENT
Start: 2019-08-29 | End: 2019-08-29

## 2019-08-29 RX ORDER — LIDOCAINE HYDROCHLORIDE 20 MG/ML
INJECTION, SOLUTION INTRAVENOUS AS NEEDED
Status: DISCONTINUED | OUTPATIENT
Start: 2019-08-29 | End: 2019-08-29 | Stop reason: SURG

## 2019-08-29 RX ORDER — BUPIVACAINE HYDROCHLORIDE 2.5 MG/ML
INJECTION, SOLUTION EPIDURAL; INFILTRATION; INTRACAUDAL
Status: COMPLETED | OUTPATIENT
Start: 2019-08-29 | End: 2019-08-29

## 2019-08-29 RX ORDER — SODIUM CHLORIDE, SODIUM LACTATE, POTASSIUM CHLORIDE, CALCIUM CHLORIDE 600; 310; 30; 20 MG/100ML; MG/100ML; MG/100ML; MG/100ML
1000 INJECTION, SOLUTION INTRAVENOUS CONTINUOUS
Status: DISCONTINUED | OUTPATIENT
Start: 2019-08-29 | End: 2019-08-29 | Stop reason: HOSPADM

## 2019-08-29 RX ORDER — DEXAMETHASONE SODIUM PHOSPHATE 4 MG/ML
INJECTION, SOLUTION INTRA-ARTICULAR; INTRALESIONAL; INTRAMUSCULAR; INTRAVENOUS; SOFT TISSUE AS NEEDED
Status: DISCONTINUED | OUTPATIENT
Start: 2019-08-29 | End: 2019-08-29 | Stop reason: SURG

## 2019-08-29 RX ORDER — MORPHINE SULFATE 2 MG/ML
2 INJECTION, SOLUTION INTRAMUSCULAR; INTRAVENOUS
Status: DISCONTINUED | OUTPATIENT
Start: 2019-08-29 | End: 2019-08-29 | Stop reason: HOSPADM

## 2019-08-29 RX ORDER — SODIUM CHLORIDE 0.9 % (FLUSH) 0.9 %
10 SYRINGE (ML) INJECTION AS NEEDED
Status: DISCONTINUED | OUTPATIENT
Start: 2019-08-29 | End: 2019-08-29 | Stop reason: HOSPADM

## 2019-08-29 RX ORDER — LIDOCAINE HYDROCHLORIDE AND EPINEPHRINE 10; 10 MG/ML; UG/ML
INJECTION, SOLUTION INFILTRATION; PERINEURAL AS NEEDED
Status: DISCONTINUED | OUTPATIENT
Start: 2019-08-29 | End: 2019-08-29 | Stop reason: HOSPADM

## 2019-08-29 RX ORDER — ONDANSETRON 2 MG/ML
4 INJECTION INTRAMUSCULAR; INTRAVENOUS ONCE AS NEEDED
Status: DISCONTINUED | OUTPATIENT
Start: 2019-08-29 | End: 2019-08-29 | Stop reason: HOSPADM

## 2019-08-29 RX ADMIN — HYDROMORPHONE HYDROCHLORIDE 0.5 MG: 1 INJECTION, SOLUTION INTRAMUSCULAR; INTRAVENOUS; SUBCUTANEOUS at 16:54

## 2019-08-29 RX ADMIN — DEXAMETHASONE SODIUM PHOSPHATE 4 MG: 4 INJECTION, SOLUTION INTRAMUSCULAR; INTRAVENOUS at 13:38

## 2019-08-29 RX ADMIN — PROPOFOL 200 MG: 10 INJECTION, EMULSION INTRAVENOUS at 13:38

## 2019-08-29 RX ADMIN — Medication 0.5 MG: at 16:54

## 2019-08-29 RX ADMIN — FAMOTIDINE 20 MG: 10 INJECTION, SOLUTION INTRAVENOUS at 12:22

## 2019-08-29 RX ADMIN — HYDROMORPHONE HYDROCHLORIDE 2 MG: 2 INJECTION, SOLUTION INTRAMUSCULAR; INTRAVENOUS; SUBCUTANEOUS at 13:42

## 2019-08-29 RX ADMIN — BUPIVACAINE HYDROCHLORIDE 15 ML: 2.5 INJECTION, SOLUTION EPIDURAL; INFILTRATION; INTRACAUDAL; PERINEURAL at 16:12

## 2019-08-29 RX ADMIN — LIDOCAINE HYDROCHLORIDE 60 MG: 20 INJECTION, SOLUTION INTRAVENOUS at 13:38

## 2019-08-29 RX ADMIN — SODIUM CHLORIDE, POTASSIUM CHLORIDE, SODIUM LACTATE AND CALCIUM CHLORIDE: 600; 310; 30; 20 INJECTION, SOLUTION INTRAVENOUS at 15:50

## 2019-08-29 RX ADMIN — ONDANSETRON 4 MG: 2 INJECTION INTRAMUSCULAR; INTRAVENOUS at 13:38

## 2019-08-29 RX ADMIN — SODIUM CHLORIDE, POTASSIUM CHLORIDE, SODIUM LACTATE AND CALCIUM CHLORIDE 1000 ML: 600; 310; 30; 20 INJECTION, SOLUTION INTRAVENOUS at 12:10

## 2019-08-29 RX ADMIN — CEFAZOLIN SODIUM 2 G: 2 SOLUTION INTRAVENOUS at 13:42

## 2019-08-29 NOTE — ANESTHESIA PROCEDURE NOTES
Peripheral Block      Patient reassessed immediately prior to procedure    Reason for block: at surgeon's request and post-op pain management  Performed by  CRNA: Maxim Gooden CRNA  Preanesthetic Checklist  Completed: patient identified, site marked, surgical consent, pre-op evaluation, timeout performed, IV checked, risks and benefits discussed and monitors and equipment checked  Prep:  Pt Position: supine  Sterile barriers:cap, gloves, mask and sterile barriers  Prep: ChloraPrep  Patient monitoring: blood pressure monitoring, continuous pulse oximetry and EKG  Procedure    Guidance:ultrasound guided  ULTRASOUND INTERPRETATION. Using ultrasound guidance a gauge needle was placed in close proximity to the femoral nerve, at which point, under ultrasound guidance anesthetic was injected in the area of the nerve and spread of the anesthesia was seen on ultrasound in close proximity thereto.  There were no abnormalities seen on ultrasound; a digital image was taken; and the patient tolerated the procedure with no complications. Images:still images obtained    Laterality:left  Block Type:adductor canal block  Injection Technique:single-shot  Needle Type:echogenic  Needle Gauge:18 G  Resistance on Injection: none    Medications Used: bupivacaine PF (MARCAINE) injection 0.25%, 15 mL      Medications  Comment:Adjuncts per total volume of LA:    Decadron 10 mg PSF    If required, intravenous sedation was given -- see meds on anesthesia record.    Post Assessment  Injection Assessment: negative aspiration for heme, no paresthesia on injection and incremental injection  Patient Tolerance:comfortable throughout block  Complications:no  Additional Notes    +++++++++++++++++++++++++++++++++++++++++    Procedure:          SINGLE ADDUCTOR CANAL BLOCK                                 Patient analgesia was achieved with IV Sedation( see meds)       The pt was placed in the Supine position.  The Insertion site was  prepped  and Draped in sterile fashion.  A BBraun echogenic needle was then  inserted approximately midline, mid-thigh and advanced In-plane with Ultrasound guidance. The Vastus medialis and Sartorius muscle were visualized and the needle tip was placed in the adductor canal,  lateral to the femoral artery.  LA injection spread was visualized, injection was incremental 1-5 ml; injection pressure was normal or little; no intraneural injection; no vascular injection. LA dose was injected thru the needle (see dose above).

## 2019-08-29 NOTE — ANESTHESIA PROCEDURE NOTES
Airway  Urgency: elective    Airway not difficult    General Information and Staff    Patient location during procedure: OR  CRNA: Maxim Gooden CRNA    Indications and Patient Condition  Indications for airway management: airway protection    Preoxygenated: yes  Mask difficulty assessment: 0 - not attempted    Final Airway Details  Final airway type: supraglottic airway      Successful airway: classic  Size 3    Number of attempts at approach: 1    Additional Comments  Airway pressure leak at <20cm/h20

## 2019-08-29 NOTE — ANESTHESIA PREPROCEDURE EVALUATION
Anesthesia Evaluation     Patient summary reviewed and Nursing notes reviewed   NPO Solid Status: > 8 hours  NPO Liquid Status: > 8 hours           Airway   Mallampati: II  TM distance: >3 FB  Neck ROM: full  Difficult intubation highly probable and Large neck circumference  Dental      Pulmonary    (+) a smoker Current, shortness of breath, sleep apnea, decreased breath sounds,   Cardiovascular     ECG reviewed    (+) hypertension, HSU,       Neuro/Psych  GI/Hepatic/Renal/Endo    (+) obesity, morbid obesity,      Musculoskeletal     (-) neck stiffness  Abdominal   (+) obese,    Substance History   (+) alcohol use,      OB/GYN          Other        ROS/Med Hx Other: Labs reviewed  cxr nad  ekg sr                    Anesthesia Plan    ASA 3     general with block   (Risks and benefits discussed including risk of aspiration, recall and dental damage. All patient questions answered.    Will continue with plan of care.)  intravenous induction   Anesthetic plan, all risks, benefits, and alternatives have been provided, discussed and informed consent has been obtained with: patient.    Plan discussed with CRNA.

## 2019-09-12 ENCOUNTER — OFFICE VISIT (OUTPATIENT)
Dept: ORTHOPEDIC SURGERY | Facility: CLINIC | Age: 29
End: 2019-09-12

## 2019-09-12 VITALS — RESPIRATION RATE: 18 BRPM | BODY MASS INDEX: 44.52 KG/M2 | WEIGHT: 311 LBS | HEIGHT: 70 IN

## 2019-09-12 DIAGNOSIS — Z98.890 S/P ACL RECONSTRUCTION: Primary | ICD-10-CM

## 2019-09-12 DIAGNOSIS — Z98.890 S/P LEFT KNEE ARTHROSCOPY: ICD-10-CM

## 2019-09-12 PROCEDURE — 99024 POSTOP FOLLOW-UP VISIT: CPT | Performed by: PHYSICIAN ASSISTANT

## 2019-09-12 NOTE — PROGRESS NOTES
Subjective   Patient ID: Sandor Cordon is a 29 y.o. male is here today for a post-operative visit.  Post-op of the Left Knee (ATS, PLM, 1 compartment chondroplasty, ACL reconstruction w/ allograft 8/29/19, denies pain)              History of Present Illness      Pain controlled: [] no   [x] yes   Medication refill requested: [x] no   [] yes    Patient compliant with instructions: [] no   [x] yes   Other: Reports good progress since surgery.     Past Medical History:   Diagnosis Date   • Alcohol use    • Current every day smoker    • Hypertension    • Morbid obesity with BMI of 40.0-44.9, adult (CMS/[facility])    • Risk factors for obstructive sleep apnea    • Tibial plateau fracture, left 2019        Past Surgical History:   Procedure Laterality Date   • KNEE ARTHROSCOPY Left 4/9/2019    Procedure: knee diagnostic arthroscopy left, reduction of tibial plateau fracture, fixation with two compression screws, assessment of ACL rupture, partial lateral meniscectomy, lateral chondroplasty;  Surgeon: Wes Maradiaga MD;  Location: [facility];  Service: Orthopedics   • KNEE ARTHROSCOPY Left 8/29/2019    Procedure: DIAGNOSTIC KNEE ARTHROSCOPY LEFT WITH PARTIAL LATERAL MENISECTOMY, ONE COMPARTMENT CHONDROPLASTY, ANTERIOR CRUCIATE LIGAMENT ALLOGRAFT RECONSTRUCTION;  Surgeon: Wes Maradiaga MD;  Location: [facility] OR;  Service: Orthopedics       No Known Allergies    Review of Systems   Constitutional: Negative for diaphoresis, fever and unexpected weight change.   HENT: Negative for dental problem and sore throat.    Eyes: Negative for visual disturbance.   Respiratory: Negative for shortness of breath.    Cardiovascular: Negative for chest pain.   Gastrointestinal: Negative for abdominal pain, constipation, diarrhea, nausea and vomiting.   Genitourinary: Negative for difficulty urinating and frequency.   Neurological: Negative for headaches.   Hematological: Does not bruise/bleed easily.       I have reviewed the  "above medical and surgical history, family history, social history, medications, allergies and review of systems.    Objective   Resp 18   Ht 177.8 cm (70\")   Wt (!) 141 kg (311 lb)   BMI 44.62 kg/m²       Signs of infection: [x] no                    [] yes   Drainage: [x] no                    [] yes   Incision: [x] healing well     []healed well   Motor exam intact: [] no                    [x] yes   Neurovascular exam intact: [] no                    [x] yes   Signs of compartment syndrome: [x] no                    [] yes   Signs of DVT: [x] no                    [] yes   Other:      Physical Exam  Ortho Exam    Extremity DVT signs are Negative on physical exam with negative Nicolas sign, with no calf pain, with no palpable cords, with no increased pain with passive stretch/extension and with no skin tone change  Neurologic Exam    Assessment/Plan   Independent Review of Radiographic Studies:    No new imaging done today.  Laboratory and Other Studies:  No new results reviewed today.   Medical Decision Making:    Stable neurovascular exam.         Procedures     Sandor was seen today for post-op.    Diagnoses and all orders for this visit:    S/P ACL reconstruction  -     Ambulatory Referral to Physical Therapy Evaluate and treat (please follow the ACL reconstruction rehab protocol- this was given to the patient), Ortho; Heat; Stretching, ROM, Strengthening    S/P left knee arthroscopy         Recommendations/Plan:     Sutures Staples or Pins [x] Removed today  [] At prior visit  [] Plan removal later   Physical therapy: [x]rehab facility  []outpatient referral  [] therapy ongoing   Ultrasound: [x]not ordered         []order given to patient   Labs: [x]not ordered         []order given to patient   Weight Bearing status: []Full []WBAT [x]PWB []NWB []Other      Physical therapy referral given  Discussion of orthopaedic goals and activities and patient and/or guardian expressed appreciation.  Call or notify for " any adverse effect from injection therapy  Ice, heat, and/or modalities as beneficial  Watch for signs and symptoms of infection  Guided on proper techniques for mobility, strength, agility and/or conditioning exercises           Exercise, medications, injections, other patient advice, and return appointment as noted.  Patient was given the rehab protocol for his PT specific for ACl surgery  Patient was placed in a knee ROM ACL brace 0-60 degrees  Fu in 8 weeks    Patient is encouraged and agreeable to call or return sooner for any issues or concerns.

## 2019-09-16 ENCOUNTER — OFFICE VISIT (OUTPATIENT)
Dept: PHYSICAL THERAPY | Facility: CLINIC | Age: 29
End: 2019-09-16

## 2019-09-16 DIAGNOSIS — Z98.890 S/P REPAIR OF ANTERIOR CRUCIATE LIGAMENT: Primary | ICD-10-CM

## 2019-09-16 PROCEDURE — 97161 PT EVAL LOW COMPLEX 20 MIN: CPT | Performed by: PHYSICAL THERAPIST

## 2019-09-16 PROCEDURE — 97110 THERAPEUTIC EXERCISES: CPT | Performed by: PHYSICAL THERAPIST

## 2019-09-16 PROCEDURE — 97112 NEUROMUSCULAR REEDUCATION: CPT | Performed by: PHYSICAL THERAPIST
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
## 2019-09-18 ENCOUNTER — TREATMENT (OUTPATIENT)
Dept: PHYSICAL THERAPY | Facility: CLINIC | Age: 29
End: 2019-09-18

## 2019-09-18 DIAGNOSIS — Z98.890 S/P REPAIR OF ANTERIOR CRUCIATE LIGAMENT: Primary | ICD-10-CM

## 2019-09-18 PROCEDURE — 97110 THERAPEUTIC EXERCISES: CPT | Performed by: PHYSICAL THERAPIST

## 2019-09-18 PROCEDURE — 97112 NEUROMUSCULAR REEDUCATION: CPT | Performed by: PHYSICAL THERAPIST

## 2019-09-18 PROCEDURE — 97140 MANUAL THERAPY 1/> REGIONS: CPT | Performed by: PHYSICAL THERAPIST

## 2019-09-18 NOTE — PROGRESS NOTES
Physical Therapy Daily Progress Note        Sandor Cordon reports 0/10 pain today at rest.  Pt reports that he has been doing well with no pain in the knee and performing HEP. Pt states that the knee is tight when going into flexion.         Objective Pt present to PT today with no distress at rest.     Pt fitted for immobilizing brace today which he brought in due to having some trouble with brace sliding down.     Pt tolerated exercise and estim well today with no increased pain in the L knee.       See Exercise, Manual, and Modality Logs for complete treatment.     Assessment/Plan  Pt continues to be pain free with simple hip exercises and mobility activities for improved ROM. Pt would benefit from PT to help increase quad contraction, hip strength, and knee mobility as allowed per protocol.       Progress per Plan of Care  Progress ROM           Manual Therapy:    9     mins  19928;  Therapeutic Exercise:    20     mins  07051;     Neuromuscular Shaniqua:    15    mins  83511;    Therapeutic Activity:          mins  99938;     Gait Training:        ___  mins  81330;     Ultrasound:          mins  70226;    Electrical Stimulation:         mins  73237 ( );  Dry Needling          mins self-pay    Timed Treatment:   44   mins   Total Treatment:     47   mins    Kang Flores, PT  Physical Therapist

## 2019-09-23 ENCOUNTER — TREATMENT (OUTPATIENT)
Dept: PHYSICAL THERAPY | Facility: CLINIC | Age: 29
End: 2019-09-23

## 2019-09-23 DIAGNOSIS — Z98.890 S/P REPAIR OF ANTERIOR CRUCIATE LIGAMENT: Primary | ICD-10-CM

## 2019-09-23 PROCEDURE — 97112 NEUROMUSCULAR REEDUCATION: CPT | Performed by: PHYSICAL THERAPIST

## 2019-09-23 PROCEDURE — 97140 MANUAL THERAPY 1/> REGIONS: CPT | Performed by: PHYSICAL THERAPIST

## 2019-09-23 PROCEDURE — 97110 THERAPEUTIC EXERCISES: CPT | Performed by: PHYSICAL THERAPIST

## 2019-09-23 NOTE — PROGRESS NOTES
Physical Therapy Daily Progress Note        Sandor Cordon reports 0/10 pain today at rest.  Pt reports that he still has no pain in the knee and that he has been working on the HEP to help improve quad activation and knee motion. Pt reports that he has been up and about more due to having the brace fit better since last visit.         Objective Pt present to PT today with no distress at rest.     Pt tolerated exercises and manual therapy well today with no increased pain.       See Exercise, Manual, and Modality Logs for complete treatment.     Assessment/Plan  Pt continues to improve motion and quad activation on the L knee. Pt incisions healing well. Pt continues to abide by precautions provided by surgeon. Pt would benefit from PT to help increase knee motion, hip strength, and improve function as able and allowed per protocol.       Progress per Plan of Care  Progress as tolerated           Manual Therapy:    9     mins  64893;  Therapeutic Exercise:    22     mins  44779;     Neuromuscular Shaniqua:   15     mins  65131;    Therapeutic Activity:          mins  97101;     Gait Training:        ___  mins  60007;     Ultrasound:          mins  72032;    Electrical Stimulation:         mins  25653 ( );  Dry Needling          mins self-pay    Timed Treatment:   46   mins   Total Treatment:     48   mins    Kang Flores, PT  Physical Therapist

## 2019-09-25 ENCOUNTER — TREATMENT (OUTPATIENT)
Dept: PHYSICAL THERAPY | Facility: CLINIC | Age: 29
End: 2019-09-25

## 2019-09-25 DIAGNOSIS — Z98.890 S/P REPAIR OF ANTERIOR CRUCIATE LIGAMENT: Primary | ICD-10-CM

## 2019-09-25 PROCEDURE — 97112 NEUROMUSCULAR REEDUCATION: CPT | Performed by: PHYSICAL THERAPIST

## 2019-09-25 PROCEDURE — 97110 THERAPEUTIC EXERCISES: CPT | Performed by: PHYSICAL THERAPIST

## 2019-09-25 PROCEDURE — 97140 MANUAL THERAPY 1/> REGIONS: CPT | Performed by: PHYSICAL THERAPIST

## 2019-09-25 NOTE — PROGRESS NOTES
Physical Therapy Daily Progress Note        Sandor Cordon reports 0/10 pain today at rest.  Pt reports that he continues to have no pain in the L knee and is doing well with the HEP. Pt states that his flexion in the knee seems to be coming along well.        Objective Pt present to PT today with no distress at rest.    Pt tolerated exercises well today with no increased pain in the L knee.       See Exercise, Manual, and Modality Logs for complete treatment.     Assessment/Plan  Pt continues to progress flexion of L knee and doing well hip strengthening exercises. Pt to begin WB exercises to help improve hip stability. Pt would benefit from PT to help improve knee motion, strength, and stability as allowed per protocol.       Progress per Plan of Care  Progress as tolerated           Manual Therapy:    9     mins  89444;  Therapeutic Exercise:    14     mins  88425;     Neuromuscular Shaniqua:  15      mins  01018;    Therapeutic Activity:          mins  46669;     Gait Training:        ___  mins  76211;     Ultrasound:          mins  91723;    Electrical Stimulation:         mins  92758 ( );  Dry Needling          mins self-pay    Timed Treatment:   38   mins   Total Treatment:     52   mins    Kang Flores, PT  Physical Therapist

## 2019-10-01 ENCOUNTER — TREATMENT (OUTPATIENT)
Dept: PHYSICAL THERAPY | Facility: CLINIC | Age: 29
End: 2019-10-01

## 2019-10-01 DIAGNOSIS — Z98.890 S/P REPAIR OF ANTERIOR CRUCIATE LIGAMENT: Primary | ICD-10-CM

## 2019-10-01 PROCEDURE — 97110 THERAPEUTIC EXERCISES: CPT | Performed by: PHYSICAL THERAPIST

## 2019-10-01 PROCEDURE — 97112 NEUROMUSCULAR REEDUCATION: CPT | Performed by: PHYSICAL THERAPIST

## 2019-10-01 NOTE — PROGRESS NOTES
Physical Therapy Daily Progress Note        Sandor Cordon reports 0/10 pain today at rest.  Pt reports that he has not been having any problems with his L knee but has been using his crutch and braces as necessary. Pt reports that he has some pain off and on in the knee but is pain free most of the time.         Objective Pt present to PT today with no distress at rest.     Pt tolerated exercises and increased activities well today without pain in the L knee.       See Exercise, Manual, and Modality Logs for complete treatment.     Assessment/Plan  Pt continues to progress well with ROM of L knee. Pt did well with leg press today. Pt would benefit from PT to help improve knee motion, strength, and stability to help improve independence with gait and daily activities.       Progress per Plan of Care  Assess reaction to treatment.            Manual Therapy:         mins  54189;  Therapeutic Exercise:    42     mins  97060;     Neuromuscular Shaniqua:   15     mins  83501;    Therapeutic Activity:          mins  80541;     Gait Training:        ___  mins  25197;     Ultrasound:          mins  43119;    Electrical Stimulation:         mins  10188 ( );  Dry Needling          mins self-pay    Timed Treatment:   57   mins   Total Treatment:     57   mins    Kang Flores, PT  Physical Therapist

## 2019-10-03 ENCOUNTER — TREATMENT (OUTPATIENT)
Dept: PHYSICAL THERAPY | Facility: CLINIC | Age: 29
End: 2019-10-03

## 2019-10-03 DIAGNOSIS — Z98.890 S/P REPAIR OF ANTERIOR CRUCIATE LIGAMENT: Primary | ICD-10-CM

## 2019-10-03 PROCEDURE — 97110 THERAPEUTIC EXERCISES: CPT | Performed by: PHYSICAL THERAPIST

## 2019-10-03 PROCEDURE — 97112 NEUROMUSCULAR REEDUCATION: CPT | Performed by: PHYSICAL THERAPIST

## 2019-10-03 NOTE — PROGRESS NOTES
Physical Therapy Daily Progress Note        Sandor Cordon reports 0/10 pain today at rest.  Pt reports that he had a little pain and soreness in the hamstrings on the L LE after last session. Pt states that overall he has had almost no pain and the knee did very well after last session.         Objective Pt present to PT today with no distress at rest ambulating without crutches.     Pt tolerated exercises well today without increased pain in the L knee.       See Exercise, Manual, and Modality Logs for complete treatment.     Assessment/Plan  Pt continues to progress well with motion in the L knee and increased quad activation. Pt to continue progressing as allowed per protocol to increase knee ROM, strength, and stability to return to pain free daily activities and independence with ambulation and mobility.       Progress per Plan of Care  Progress as tolerated           Manual Therapy:    3     mins  34368;  Therapeutic Exercise:    40     mins  75653;     Neuromuscular Shaniqua:    18    mins  77081;    Therapeutic Activity:          mins  86145;     Gait Training:        ___  mins  71181;     Ultrasound:          mins  49371;    Electrical Stimulation:         mins  15150 ( );  Dry Needling          mins self-pay    Timed Treatment:   61   mins   Total Treatment:     61   mins    Kang Flores, PT  Physical Therapist

## 2019-10-08 ENCOUNTER — TREATMENT (OUTPATIENT)
Dept: PHYSICAL THERAPY | Facility: CLINIC | Age: 29
End: 2019-10-08

## 2019-10-08 DIAGNOSIS — Z98.890 S/P REPAIR OF ANTERIOR CRUCIATE LIGAMENT: Primary | ICD-10-CM

## 2019-10-08 PROCEDURE — 97110 THERAPEUTIC EXERCISES: CPT | Performed by: PHYSICAL THERAPIST

## 2019-10-08 PROCEDURE — 97112 NEUROMUSCULAR REEDUCATION: CPT | Performed by: PHYSICAL THERAPIST

## 2019-10-08 PROCEDURE — 97014 ELECTRIC STIMULATION THERAPY: CPT | Performed by: PHYSICAL THERAPIST

## 2019-10-08 NOTE — PROGRESS NOTES
Physical Therapy Daily Progress Note        Sandor Cordon reports 0/10 pain today at rest.  Pt reports he felt fine after last session with no pain or increased soreness with increased activities last visit. Pt reports that he is moving better and not using his crutch.         Objective Pt present to PT today with no distress at rest.     Pt tolerated exercises well today with no increased pain in the L knee with activities.       See Exercise, Manual, and Modality Logs for complete treatment.     Assessment/Plan  Pt continues to improve ROM in the L knee and quad contraction. Pt to progress quad activation training and hip strengthening as tolerated. Pt would benefit from PT to help improve knee motion, strength, and function as allowed per protocol.       Progress per Plan of Care  Progress hip strengthening           Manual Therapy:         mins  00232;  Therapeutic Exercise:    41     mins  06440;     Neuromuscular Shaniqua:    15    mins  44202;    Therapeutic Activity:          mins  73959;     Gait Training:        ___  mins  03368;     Ultrasound:          mins  24241;    Electrical Stimulation:         mins  82941 ( );  Dry Needling          mins self-pay    Timed Treatment:   56   mins   Total Treatment:     56   mins    Kang Flores, PT  Physical Therapist

## 2019-10-10 ENCOUNTER — TREATMENT (OUTPATIENT)
Dept: PHYSICAL THERAPY | Facility: CLINIC | Age: 29
End: 2019-10-10

## 2019-10-10 DIAGNOSIS — Z98.890 S/P REPAIR OF ANTERIOR CRUCIATE LIGAMENT: Primary | ICD-10-CM

## 2019-10-10 PROCEDURE — 97014 ELECTRIC STIMULATION THERAPY: CPT | Performed by: PHYSICAL THERAPIST

## 2019-10-10 PROCEDURE — 97112 NEUROMUSCULAR REEDUCATION: CPT | Performed by: PHYSICAL THERAPIST

## 2019-10-10 PROCEDURE — 97110 THERAPEUTIC EXERCISES: CPT | Performed by: PHYSICAL THERAPIST

## 2019-10-10 NOTE — PROGRESS NOTES
Physical Therapy Daily Progress Note        Sandor Cordon reports 0/10 pain today at rest.  Pt reports that he has a little soreness in the lateral knee today and the calf due to working outside in the yard. Pt reports that he feels fine but was instructed to avoid doing much outside to avoid putting graft in danger due to uneven ground.         Objective Pt present to PT today with no distress at rest.     Pt tolerated exercises well today with no increased pain despite the increased WB activities.       See Exercise, Manual, and Modality Logs for complete treatment.     Assessment/Plan  Pt continues to improve with closed chain standing exercises. Pt to continue to progress standing hip strengthening as tolerated. Pt would benefit from PT to help improve strength, motion, and function of the knee as allowed per protocol.       Progress per Plan of Care  Progress as tolerated           Manual Therapy:         mins  02663;  Therapeutic Exercise:    22     mins  14566;     Neuromuscular Shaniqua:    10    mins  05553;    Therapeutic Activity:          mins  95873;     Gait Training:        ___  mins  08582;     Ultrasound:          mins  74286;    Electrical Stimulation:    15     mins  93300 ( );  Dry Needling          mins self-pay    Timed Treatment:   47   mins   Total Treatment:     61   mins    Kang Flores, PT  Physical Therapist

## 2019-10-15 ENCOUNTER — TREATMENT (OUTPATIENT)
Dept: PHYSICAL THERAPY | Facility: CLINIC | Age: 29
End: 2019-10-15

## 2019-10-15 DIAGNOSIS — Z98.890 S/P REPAIR OF ANTERIOR CRUCIATE LIGAMENT: Primary | ICD-10-CM

## 2019-10-15 PROCEDURE — 97112 NEUROMUSCULAR REEDUCATION: CPT | Performed by: PHYSICAL THERAPIST

## 2019-10-15 PROCEDURE — 97110 THERAPEUTIC EXERCISES: CPT | Performed by: PHYSICAL THERAPIST

## 2019-10-15 NOTE — PROGRESS NOTES
Physical Therapy Daily Progress Note        Sandor Cordon reports 0/10 pain today at rest.  Pt reports that this weekend he walked out in the woods to help his friend take care of a deer that he killed. Pt reports he was careful and had a walking stick that he used while going through the woods. Pt reports that he was sore but that he has not been having any pain in the knee and feels stable unlike before when rehabbing prior to surgery.         Objective Pt present to PT today with no distress at rest.     Pt tolerated increased exercises and standing activities well today with no pain in the L knee.       See Exercise, Manual, and Modality Logs for complete treatment.     Assessment/Plan  Pt continues to improve with WB function and activity tolerance with no pain in the L knee. Pt quad function has returned quickly and no muscle stim was performed today. Pt knee flexion is improving towards WNL with tightness at end range. Pt would benefit from PT to help improve knee mobility, strength, and function.       Progress per Plan of Care  Progress as tolerated           Manual Therapy:    5     mins  03972;  Therapeutic Exercise:    41     mins  58529;     Neuromuscular Shaniqua:    13    mins  62842;    Therapeutic Activity:          mins  17275;     Gait Training:        ___  mins  48981;     Ultrasound:          mins  68869;    Electrical Stimulation:         mins  59747 ( );  Dry Needling          mins self-pay    Timed Treatment:   59   mins   Total Treatment:     59   mins    Kang Flores, PT  Physical Therapist

## 2019-10-17 ENCOUNTER — TREATMENT (OUTPATIENT)
Dept: PHYSICAL THERAPY | Facility: CLINIC | Age: 29
End: 2019-10-17

## 2019-10-17 DIAGNOSIS — Z98.890 S/P REPAIR OF ANTERIOR CRUCIATE LIGAMENT: Primary | ICD-10-CM

## 2019-10-17 PROCEDURE — 97110 THERAPEUTIC EXERCISES: CPT | Performed by: PHYSICAL THERAPIST

## 2019-10-17 PROCEDURE — 97112 NEUROMUSCULAR REEDUCATION: CPT | Performed by: PHYSICAL THERAPIST

## 2019-10-17 NOTE — PROGRESS NOTES
Physical Therapy Daily Progress Note        Sandor Cordon reports 0/10 pain today at rest.  Pt reports that he has no pain today with ambulation although lateral movement has some pain. Pt states that he has a small red spot on his incision that his been red recently but has no pain with palpation.         Objective Pt present to PT today with no distress at rest.     Pt tolerated exercises well today with no increased pain in the L knee.       See Exercise, Manual, and Modality Logs for complete treatment.     Assessment/Plan  Pt continues to improve function and strength of the L knee. Pt has some tightness with L knee flexion although flexion is progressing well. Pt to continue strengthening and mobility work to help improve L knee function for return to pain free activities.       Progress per Plan of Care  Progress as tolerated           Manual Therapy:         mins  57928;  Therapeutic Exercise:    43     mins  24968;     Neuromuscular Shaniqua:    12    mins  49271;    Therapeutic Activity:          mins  05799;     Gait Training:        ___  mins  23331;     Ultrasound:          mins  14040;    Electrical Stimulation:         mins  89188 ( );  Dry Needling          mins self-pay    Timed Treatment:   55   mins   Total Treatment:     65   mins    Kang Flores, PT  Physical Therapist

## 2019-10-22 ENCOUNTER — TREATMENT (OUTPATIENT)
Dept: PHYSICAL THERAPY | Facility: CLINIC | Age: 29
End: 2019-10-22

## 2019-10-22 DIAGNOSIS — Z98.890 S/P REPAIR OF ANTERIOR CRUCIATE LIGAMENT: Primary | ICD-10-CM

## 2019-10-22 PROCEDURE — 97110 THERAPEUTIC EXERCISES: CPT | Performed by: PHYSICAL THERAPIST

## 2019-10-22 NOTE — PROGRESS NOTES
Physical Therapy Daily Progress Note        Sandor Cordon reports 0/10 pain today at rest.  Pt reports that he has had not pain since last visit and had no problems over the weekend. Pt reports that he has some tightness in the back of the knee.         Objective Pt present to PT today with no distress at rest.     Pt tolerated increased activities well today with no increased pain in the L knee.       See Exercise, Manual, and Modality Logs for complete treatment.     Assessment/Plan  Pt continues to improve knee motion and WB function. Pt knee still lacking minimal flexion compared to the R knee. Pt to continue with PT to help improve functional mobility, strength, and stability in the L knee to return to pain free daily activities.       Progress per Plan of Care  Progress as tolerated           Manual Therapy:    5     mins  10636;  Therapeutic Exercise:    46     mins  85659;     Neuromuscular Shaniqua:        mins  47317;    Therapeutic Activity:          mins  71375;     Gait Training:        ___  mins  86898;     Ultrasound:          mins  99225;    Electrical Stimulation:         mins  04651 ( );  Dry Needling          mins self-pay    Timed Treatment:   51   mins   Total Treatment:     62   mins    Kang Flores, PT  Physical Therapist

## 2019-10-24 ENCOUNTER — TREATMENT (OUTPATIENT)
Dept: PHYSICAL THERAPY | Facility: CLINIC | Age: 29
End: 2019-10-24

## 2019-10-24 DIAGNOSIS — Z98.890 S/P REPAIR OF ANTERIOR CRUCIATE LIGAMENT: Primary | ICD-10-CM

## 2019-10-24 PROCEDURE — 97112 NEUROMUSCULAR REEDUCATION: CPT | Performed by: PHYSICAL THERAPIST

## 2019-10-24 PROCEDURE — 97110 THERAPEUTIC EXERCISES: CPT | Performed by: PHYSICAL THERAPIST

## 2019-10-24 NOTE — PROGRESS NOTES
Physical Therapy Daily Progress Note        Sandor Cordon reports 0/10 pain today at rest.  Pt reports that he had a little pain in the back of the knee after last session. Pt states that after resting for a little while he has no pain in the knee today.         Objective Pt present to PT today with no distress at rest.     Pt tolerated exercises and standing activities well today with no increased pain in the L knee.       See Exercise, Manual, and Modality Logs for complete treatment.     Assessment/Plan  Pt continues to improve without issues with the L knee. Pt displayed good balance with dynamic activities today although still has some weakness in the L quad with decreased control on stairs. Pt would benefit from PT to help improve L knee control, strength, and activity tolerance.       Progress per Plan of Care  Progress as tolerated           Manual Therapy:         mins  26560;  Therapeutic Exercise:    36     mins  77674;     Neuromuscular Shaniqua:    9    mins  85538;    Therapeutic Activity:          mins  60242;     Gait Training:        ___  mins  53876;     Ultrasound:          mins  53291;    Electrical Stimulation:         mins  93579 ( );  Dry Needling          mins self-pay    Timed Treatment:   45   mins   Total Treatment:     57   mins    Kang Flores, PT  Physical Therapist

## 2019-10-29 ENCOUNTER — TREATMENT (OUTPATIENT)
Dept: PHYSICAL THERAPY | Facility: CLINIC | Age: 29
End: 2019-10-29

## 2019-10-29 DIAGNOSIS — Z98.890 S/P REPAIR OF ANTERIOR CRUCIATE LIGAMENT: Primary | ICD-10-CM

## 2019-10-29 PROCEDURE — 97110 THERAPEUTIC EXERCISES: CPT | Performed by: PHYSICAL THERAPIST

## 2019-10-29 PROCEDURE — 97112 NEUROMUSCULAR REEDUCATION: CPT | Performed by: PHYSICAL THERAPIST

## 2019-10-29 NOTE — PROGRESS NOTES
Physical Therapy Daily Progress Note        Sandor Cordon reports 0/10 pain today at rest.  Pt reports that he has not had any problems with the knee to report. Pt states that his knee gets stiff and sore sometimes when getting out of bed after sleeping but his knee warms up quickly. Pt states he goes to doctor on 11/12 and expects to be cleared to return to work.         Objective Pt present to PT today with no distress at rest.     Pt tolerated increased activities well today with no increased pain or soreness in the L knee.       See Exercise, Manual, and Modality Logs for complete treatment.     Assessment/Plan  Pt continues to maintain good motion in the L knee and is improving balance, function, and stability on the L LE. Pt would benefit from PT to continue increasing quad strength, knee mobility, and pain free function.       Progress per Plan of Care  Progress as tolerated           Manual Therapy:         mins  83625;  Therapeutic Exercise:    40     mins  62818;     Neuromuscular Shaniqua:    12    mins  74438;    Therapeutic Activity:          mins  42124;     Gait Training:        ___  mins  06469;     Ultrasound:          mins  37466;    Electrical Stimulation:         mins  84976 ( );  Dry Needling          mins self-pay    Timed Treatment:   52   mins   Total Treatment:     62   mins    Kang Flores, PT  Physical Therapist

## 2019-10-31 ENCOUNTER — TREATMENT (OUTPATIENT)
Dept: PHYSICAL THERAPY | Facility: CLINIC | Age: 29
End: 2019-10-31

## 2019-10-31 DIAGNOSIS — Z98.890 S/P REPAIR OF ANTERIOR CRUCIATE LIGAMENT: Primary | ICD-10-CM

## 2019-10-31 PROCEDURE — 97110 THERAPEUTIC EXERCISES: CPT | Performed by: PHYSICAL THERAPIST

## 2019-10-31 NOTE — PROGRESS NOTES
Physical Therapy Daily Progress Note        Sandor Cordon reports 0/10 pain today at rest.  Pt reports that he has a little soreness through the anterior knee although states he thinks it is the weather as this has not been normal. Pt reports that he has not had any problems or pain since last session.         Objective Pt present to PT today with no distress at rest.     Pt tolerated exercises well today with no increased pain in the L knee with activities.       See Exercise, Manual, and Modality Logs for complete treatment.     Assessment/Plan  Pt continues to improve balance, motion, and activity tolerance in the L knee. Pt has improve functional movement and full mobility in the L knee. Pt to progress balance and stability to help improve functional mobility and return to normal work and daily activities.       Progress per Plan of Care  Progress as tolerated           Manual Therapy:         mins  71798;  Therapeutic Exercise:     57    mins  52611;     Neuromuscular Shaniqua:        mins  39804;    Therapeutic Activity:          mins  04608;     Gait Training:        ___  mins  49988;     Ultrasound:          mins  69147;    Electrical Stimulation:         mins  69088 ( );  Dry Needling          mins self-pay    Timed Treatment:   57   mins   Total Treatment:     57   mins    Kang Flores, PT  Physical Therapist

## 2019-11-11 ENCOUNTER — TREATMENT (OUTPATIENT)
Dept: PHYSICAL THERAPY | Facility: CLINIC | Age: 29
End: 2019-11-11

## 2019-11-11 DIAGNOSIS — Z98.890 S/P REPAIR OF ANTERIOR CRUCIATE LIGAMENT: Primary | ICD-10-CM

## 2019-11-11 PROCEDURE — 97110 THERAPEUTIC EXERCISES: CPT | Performed by: PHYSICAL THERAPIST

## 2019-11-11 NOTE — PROGRESS NOTES
Physical Therapy Daily Progress Note        Sandor Cordon reports 0/10 pain today at rest.  Pt reports that he has been climbing the last week up and down ladders while hunting. Pt reports that he has not had any pain other than in the L anterior knee when he wakes up or is really cold in the morning.         Objective Pt present to PT today with no distress at rest.     Pt tolerated exercises well today without increased pain in the L knee following session.       See Exercise, Manual, and Modality Logs for complete treatment.     Assessment/Plan  Pt displayed greatly decreased control in the L quad today with activities to challenge eccentric lowering. Pt to concentrate on quad activation and strengthening for the next few weeks to help improve motor control and strength in the L quad. Pt would benefit from PT to help improve strength, stability, and pain free function of the L knee.       Progress per Plan of Care  Progress as tolerated.            Manual Therapy:         mins  20289;  Therapeutic Exercise:    30     mins  34013;     Neuromuscular Shaniqua:        mins  72344;    Therapeutic Activity:          mins  60539;     Gait Training:        ___  mins  10909;     Ultrasound:          mins  30033;    Electrical Stimulation:         mins  50094 ( );  Dry Needling          mins self-pay    Timed Treatment:  30    mins   Total Treatment:     58   mins    Kang Flores, PT  Physical Therapist

## 2019-11-12 ENCOUNTER — OFFICE VISIT (OUTPATIENT)
Dept: ORTHOPEDIC SURGERY | Facility: CLINIC | Age: 29
End: 2019-11-12

## 2019-11-12 VITALS — RESPIRATION RATE: 18 BRPM | BODY MASS INDEX: 44.52 KG/M2 | HEIGHT: 70 IN | WEIGHT: 311 LBS

## 2019-11-12 DIAGNOSIS — Z98.890 S/P ACL RECONSTRUCTION: ICD-10-CM

## 2019-11-12 DIAGNOSIS — M17.32 UNILATERAL POST-TRAUMATIC OSTEOARTHRITIS, LEFT KNEE: Primary | ICD-10-CM

## 2019-11-12 DIAGNOSIS — Z98.890 S/P LEFT KNEE ARTHROSCOPY: ICD-10-CM

## 2019-11-12 PROCEDURE — 20610 DRAIN/INJ JOINT/BURSA W/O US: CPT | Performed by: PHYSICIAN ASSISTANT

## 2019-11-12 PROCEDURE — 99024 POSTOP FOLLOW-UP VISIT: CPT | Performed by: PHYSICIAN ASSISTANT

## 2019-11-12 RX ORDER — METHYLPREDNISOLONE ACETATE 40 MG/ML
40 INJECTION, SUSPENSION INTRA-ARTICULAR; INTRALESIONAL; INTRAMUSCULAR; SOFT TISSUE
Status: COMPLETED | OUTPATIENT
Start: 2019-11-12 | End: 2019-11-12

## 2019-11-12 RX ADMIN — METHYLPREDNISOLONE ACETATE 40 MG: 40 INJECTION, SUSPENSION INTRA-ARTICULAR; INTRALESIONAL; INTRAMUSCULAR; SOFT TISSUE at 11:44

## 2019-11-12 NOTE — PROGRESS NOTES
Subjective   Patient ID: Sandor Cordon is a 29 y.o. right hand dominant male is here today for a post-operative visit.  Post-op of the Left Knee (ARTHROSCOPY LEFT WITH PARTIAL LATERAL MENISECTOMY, ONE COMPARTMENT CHONDROPLASTY, ANTERIOR CRUCIATE LIGAMENT ALLOGRAFT RECONSTRUCTION - 8/29/19)          CHIEF COMPLAINT    History of Present Illness      Pain controlled: [] no   [x] yes   Medication refill requested: [x] no   [] yes    Patient compliant with instructions: [] no   [x] yes   Other: Reports fair progress since surgery.  Patient states he still has some pain to the lateral aspect of the left knee.  He denies any instability.       Past Medical History:   Diagnosis Date   • Alcohol use    • Current every day smoker    • Hypertension    • Morbid obesity with BMI of 40.0-44.9, adult (CMS/[facility])    • Risk factors for obstructive sleep apnea    • Tibial plateau fracture, left 2019        Past Surgical History:   Procedure Laterality Date   • KNEE ARTHROSCOPY Left 4/9/2019    Procedure: knee diagnostic arthroscopy left, reduction of tibial plateau fracture, fixation with two compression screws, assessment of ACL rupture, partial lateral meniscectomy, lateral chondroplasty;  Surgeon: Wes Maradiaga MD;  Location: [facility];  Service: Orthopedics   • KNEE ARTHROSCOPY Left 8/29/2019    Procedure: DIAGNOSTIC KNEE ARTHROSCOPY LEFT WITH PARTIAL LATERAL MENISECTOMY, ONE COMPARTMENT CHONDROPLASTY, ANTERIOR CRUCIATE LIGAMENT ALLOGRAFT RECONSTRUCTION;  Surgeon: Wes Maradiaga MD;  Location: [facility];  Service: Orthopedics       No Known Allergies    Review of Systems   Constitutional: Negative for diaphoresis, fever and unexpected weight change.   HENT: Negative for dental problem and sore throat.    Eyes: Negative for visual disturbance.   Respiratory: Negative for shortness of breath.    Cardiovascular: Negative for chest pain.   Gastrointestinal: Negative for abdominal pain, constipation, diarrhea, nausea  "and vomiting.   Genitourinary: Negative for difficulty urinating and frequency.   Musculoskeletal: Positive for arthralgias (left knee) and joint swelling.   Neurological: Negative for headaches.   Hematological: Does not bruise/bleed easily.       I have reviewed the above medical and surgical history, family history, social history, medications, allergies and review of systems.    Objective   Resp 18   Ht 177.8 cm (70\")   Wt (!) 141 kg (311 lb)   BMI 44.62 kg/m²       Signs of infection: [x] no                    [] yes   Drainage: [x] no                    [] yes   Incision: [x] healing well     []healed well   Motor exam intact: [] no                    [x] yes   Neurovascular exam intact: [] no                    [x] yes   Signs of compartment syndrome: [x] no                    [] yes   Signs of DVT: [x] no                    [] yes   Other:      Physical Exam  Right Knee Exam     Tests   Drawer:  Anterior - trace          Left Knee Exam     Range of Motion   Extension: 0   Flexion: 120     Tests   Drawer:  Anterior - trace         Other   Erythema: absent  Scars: present  Sensation: normal            Extremity DVT signs are Negative on physical exam with negative Nicolas sign, with no calf pain, with no palpable cords, with no increased pain with passive stretch/extension and with no skin tone change  Neurologic Exam    Assessment/Plan   Independent Review of Radiographic Studies:    No new imaging done today.  Laboratory and Other Studies:  No new results reviewed today.   Medical Decision Making:    Stable neurovascular exam.       Large Joint Arthrocentesis: L knee  Date/Time: 11/12/2019 11:44 AM  Consent given by: patient  Site marked: site marked  Timeout: Immediately prior to procedure a time out was called to verify the correct patient, procedure, equipment, support staff and site/side marked as required   Supporting Documentation  Indications: pain   Procedure Details  Location: knee - L " knee  Preparation: Patient was prepped and draped in the usual sterile fashion  Needle size: 22 G  Approach: anterolateral  Medications administered: 40 mg methylPREDNISolone acetate 40 MG/ML  Patient tolerance: patient tolerated the procedure well with no immediate complications           Sandor was seen today for post-op.    Diagnoses and all orders for this visit:    Unilateral post-traumatic osteoarthritis, left knee  -     Large Joint Arthrocentesis: L knee    S/P ACL reconstruction    S/P left knee arthroscopy         Recommendations/Plan:     Sutures Staples or Pins [] Removed today  [x] At prior visit  [] Plan removal later   Physical therapy: []rehab facility  []outpatient referral  [x] therapy ongoing   Ultrasound: [x]not ordered         []order given to patient   Labs: [x]not ordered         []order given to patient   Weight Bearing status: [x]Full []WBAT []PWB []NWB []Other      Discussion of orthopaedic goals and activities and patient and/or guardian expressed appreciation.  Guided on proper techniques for mobility, strength, agility and/or conditioning exercises  Call or notify for any adverse effect from injection therapy  Ice, heat, and/or modalities as beneficial  Watch for signs and symptoms of infection  Guided on proper techniques for mobility, strength, agility and/or conditioning exercises             Patient is encouraged and agreeable to call or return sooner for any issues or concerns.

## 2019-11-15 ENCOUNTER — TREATMENT (OUTPATIENT)
Dept: PHYSICAL THERAPY | Facility: CLINIC | Age: 29
End: 2019-11-15

## 2019-11-15 DIAGNOSIS — Z98.890 S/P REPAIR OF ANTERIOR CRUCIATE LIGAMENT: Primary | ICD-10-CM

## 2019-11-15 PROCEDURE — 97110 THERAPEUTIC EXERCISES: CPT | Performed by: PHYSICAL THERAPIST

## 2019-11-15 PROCEDURE — 97112 NEUROMUSCULAR REEDUCATION: CPT | Performed by: PHYSICAL THERAPIST

## 2019-11-15 NOTE — PROGRESS NOTES
Physical Therapy Daily Progress Note        Sandor Cordon reports 0/10 pain today at rest.  Pt reports that his knee was not bothering him today but he got a steroid injection on Tuesday which was very sore and painful today. Pt states that he was cleared for light duty at work although his work is not able to accommodate restrictions due to holiday season coming up.         Objective Pt present to PT today with no distress at rest.     Pt tolerated exercises well today with no increased pain in the knee.       See Exercise, Manual, and Modality Logs for complete treatment.     Assessment/Plan  Pt continues to have decreased motor control in the L quad decreasing stability with SL activities and when lowering himself on his L leg. Pt to continue with quad strengthening and functional activities. Pt would benefit from PT to help increased knee stability, strength, and function.       Progress per Plan of Care  Progress as tolerated           Manual Therapy:         mins  38017;  Therapeutic Exercise:    30     mins  77507;     Neuromuscular Shaniqua:    10    mins  63994;    Therapeutic Activity:          mins  43228;     Gait Training:        ___  mins  05248;     Ultrasound:          mins  07448;    Electrical Stimulation:         mins  22886 ( );  Dry Needling          mins self-pay    Timed Treatment:   40   mins   Total Treatment:     61   mins    Kang Flores, PT  Physical Therapist

## 2019-11-25 ENCOUNTER — TREATMENT (OUTPATIENT)
Dept: PHYSICAL THERAPY | Facility: CLINIC | Age: 29
End: 2019-11-25

## 2019-11-25 DIAGNOSIS — Z98.890 S/P REPAIR OF ANTERIOR CRUCIATE LIGAMENT: Primary | ICD-10-CM

## 2019-11-25 PROCEDURE — 97014 ELECTRIC STIMULATION THERAPY: CPT | Performed by: PHYSICAL THERAPIST

## 2019-11-25 PROCEDURE — 97110 THERAPEUTIC EXERCISES: CPT | Performed by: PHYSICAL THERAPIST

## 2019-11-25 NOTE — PROGRESS NOTES
Physical Therapy Daily Progress Note        Sandor Cordon reports 0/10 pain today at rest.  Pt reports that he has not pain in the knee although he does get some popping in the knee periodically. Pt states that his knee did fine while he was hunting last week and has been doing a lot of walking.         Objective Pt present to PT today with no distress at rest.     Pt tolerated exercises well today with no increased pain in the L knee following activities.       See Exercise, Manual, and Modality Logs for complete treatment.     Assessment/Plan  Pt continues to work on quad control and strength to help improve knee stability with gait and functional activities. Pt motor control has improved although still fatigues with a lot of quad activity. Pt would benefit from PT to help increased quad strength, knee stability, and functional mobility to help return to work and pain free function.       Progress per Plan of Care  Progress as tolerated           Manual Therapy:         mins  15115;  Therapeutic Exercise:    30     mins  91707;     Neuromuscular Shaniqua:        mins  09819;    Therapeutic Activity:          mins  00409;     Gait Training:        ___  mins  44007;     Ultrasound:          mins  57139;    Electrical Stimulation:    15     mins  61492 ( );  Dry Needling          mins self-pay    Timed Treatment:   45   mins   Total Treatment:     57   mins    Kang Flores, PT  Physical Therapist

## 2019-11-27 ENCOUNTER — TREATMENT (OUTPATIENT)
Dept: PHYSICAL THERAPY | Facility: CLINIC | Age: 29
End: 2019-11-27

## 2019-11-27 DIAGNOSIS — Z98.890 S/P REPAIR OF ANTERIOR CRUCIATE LIGAMENT: Primary | ICD-10-CM

## 2019-11-27 PROCEDURE — 97110 THERAPEUTIC EXERCISES: CPT | Performed by: PHYSICAL THERAPIST

## 2019-11-27 PROCEDURE — 97112 NEUROMUSCULAR REEDUCATION: CPT | Performed by: PHYSICAL THERAPIST

## 2019-11-27 NOTE — PROGRESS NOTES
Physical Therapy Daily Progress Note        Sandor Cordon reports 0/10 pain today at rest.  Pt reports that he felt fine after last session with no pain in the quad or knee following activities. Pt states that his knee has not been bothering him although still has weakness in the quad.         Objective Pt present to PT today with no distress at rest.     Pt tolerated exercises well today without increased pain in the L knee.       See Exercise, Manual, and Modality Logs for complete treatment.     Assessment/Plan  Pt continues to do well with functional strengthening and quad training. Pt to continue with functional activities to help improve mobility and function with daily activities. Pt would benefit from PT to help improve knee stability and quad control.       Progress per Plan of Care  Progress as tolerated           Manual Therapy:         mins  48094;  Therapeutic Exercise:    18     mins  34375;     Neuromuscular Shaniqua:    12    mins  90216;    Therapeutic Activity:          mins  26208;     Gait Training:        ___  mins  20377;     Ultrasound:          mins  58472;    Electrical Stimulation:         mins  40787 ( );  Dry Needling          mins self-pay    Timed Treatment:   30   mins   Total Treatment:     58   mins    Kang Flores, PT  Physical Therapist

## 2019-12-03 ENCOUNTER — TREATMENT (OUTPATIENT)
Dept: PHYSICAL THERAPY | Facility: CLINIC | Age: 29
End: 2019-12-03

## 2019-12-03 DIAGNOSIS — Z98.890 S/P REPAIR OF ANTERIOR CRUCIATE LIGAMENT: Primary | ICD-10-CM

## 2019-12-03 PROCEDURE — 97112 NEUROMUSCULAR REEDUCATION: CPT | Performed by: PHYSICAL THERAPIST

## 2019-12-03 PROCEDURE — 97110 THERAPEUTIC EXERCISES: CPT | Performed by: PHYSICAL THERAPIST

## 2019-12-03 NOTE — PROGRESS NOTES
Physical Therapy Daily Progress Note        Sandor Cordon reports 0/10 pain today at rest.  Pt reports that his control is getting better but still has some pain in the PF joint with high eccentric loads. Pt states that his knee is tight but does not have pain.         Objective Pt present to PT today with no distress at rest.     Pt tolerated exercises well today although PF pain limited ecc work done today.       See Exercise, Manual, and Modality Logs for complete treatment.     Assessment/Plan  Pt continues to have weakness and fatigue in the L quad following activities in PT clinic. Pt has some tightness in the knee and some pain in the PF joint when putting high loads through the knee. Pt would benefit from PT to help improve knee strength, stability, and function.       Progress per Plan of Care  Progress as tolerated           Manual Therapy:         mins  06521;  Therapeutic Exercise:    44     mins  53966;     Neuromuscular Shaniqua:    15    mins  13291;    Therapeutic Activity:          mins  27681;     Gait Training:        ___  mins  22021;     Ultrasound:          mins  64209;    Electrical Stimulation:         mins  56586 ( );  Dry Needling          mins self-pay    Timed Treatment:   59   mins   Total Treatment:     59   mins    Kang Flores, PT  Physical Therapist

## 2019-12-06 ENCOUNTER — TREATMENT (OUTPATIENT)
Dept: PHYSICAL THERAPY | Facility: CLINIC | Age: 29
End: 2019-12-06

## 2019-12-06 DIAGNOSIS — Z98.890 S/P REPAIR OF ANTERIOR CRUCIATE LIGAMENT: Primary | ICD-10-CM

## 2019-12-06 PROCEDURE — 97112 NEUROMUSCULAR REEDUCATION: CPT | Performed by: PHYSICAL THERAPIST

## 2019-12-06 PROCEDURE — 97110 THERAPEUTIC EXERCISES: CPT | Performed by: PHYSICAL THERAPIST

## 2019-12-06 NOTE — PROGRESS NOTES
Physical Therapy Daily Progress Note        Sandor Cordon reports 0/10 pain today at rest.  Pt reports that he still has no pain in the L knee despite still having weakness in the L quad. Pt reports that he has had some grinding in the PF joint he has noticed.         Objective Pt present to PT today with no distress at rest.     Pt tolerated exercises well today although had some pain in the PF joint with ecc lowering with box squats which were d/c'd to avoid pain.       See Exercise, Manual, and Modality Logs for complete treatment.     Assessment/Plan  Pt continues to have weakness in the quad limiting his eccentric control in the L knee. Pt having some pain in the PF joint with loaded flexion of the knee. Pt to continue with PT to help improve quad contraction, motor control, and knee stability to help increase functional mobility.       Progress per Plan of Care  Progress as tolerated           Manual Therapy:         mins  05611;  Therapeutic Exercise:    51     mins  76551;     Neuromuscular Shaniqua:    10    mins  10830;    Therapeutic Activity:          mins  39294;     Gait Training:        ___  mins  09811;     Ultrasound:          mins  51554;    Electrical Stimulation:         mins  25967 ( );  Dry Needling          mins self-pay    Timed Treatment:   61   mins   Total Treatment:     64   mins    Kang Flores, PT  Physical Therapist

## 2019-12-13 ENCOUNTER — TREATMENT (OUTPATIENT)
Dept: PHYSICAL THERAPY | Facility: CLINIC | Age: 29
End: 2019-12-13

## 2019-12-13 DIAGNOSIS — Z98.890 S/P REPAIR OF ANTERIOR CRUCIATE LIGAMENT: Primary | ICD-10-CM

## 2019-12-13 PROCEDURE — 97112 NEUROMUSCULAR REEDUCATION: CPT | Performed by: PHYSICAL THERAPIST

## 2019-12-13 PROCEDURE — 97110 THERAPEUTIC EXERCISES: CPT | Performed by: PHYSICAL THERAPIST

## 2019-12-13 NOTE — PROGRESS NOTES
Physical Therapy Daily Progress Note        Sandor Cordon reports 0/10 pain today at rest.  Pt reports that the only thing he finds difficult during the day is going down stairs. Pt states that he cannot seem to lower himself well due to weakness in the quad.        Objective Pt present to PT today with no distress at rest.     Pt tolerated exercises well today with no increased pain in the L knee following activities.       See Exercise, Manual, and Modality Logs for complete treatment.     Assessment/Plan  Pt continues to have some PF pain in the L knee. Pt continues to stress quad strengthening in the clinic. Pt would benefit from PT to help continue to strengthen quads and hamstrings to improve motor control and stability in the L knee.       Progress per Plan of Care  Progress as tolerated           Manual Therapy:         mins  72466;  Therapeutic Exercise:    42     mins  05229;     Neuromuscular Shaniqua:    14    mins  69394;    Therapeutic Activity:          mins  43262;     Gait Training:        ___  mins  31403;     Ultrasound:          mins  47852;    Electrical Stimulation:         mins  14023 ( );  Dry Needling          mins self-pay    Timed Treatment:   56   mins   Total Treatment:     56   mins    Kang Flores, PT  Physical Therapist

## 2019-12-19 ENCOUNTER — TREATMENT (OUTPATIENT)
Dept: PHYSICAL THERAPY | Facility: CLINIC | Age: 29
End: 2019-12-19

## 2019-12-19 DIAGNOSIS — Z98.890 S/P REPAIR OF ANTERIOR CRUCIATE LIGAMENT: Primary | ICD-10-CM

## 2019-12-19 PROCEDURE — 97110 THERAPEUTIC EXERCISES: CPT | Performed by: PHYSICAL THERAPIST

## 2019-12-19 PROCEDURE — 97112 NEUROMUSCULAR REEDUCATION: CPT | Performed by: PHYSICAL THERAPIST

## 2019-12-19 NOTE — PROGRESS NOTES
Physical Therapy Daily Progress Note        Sandor Cordon reports 0/10 pain today at rest.  Pt reports that he walked 7.5 miles without a problem yesterday and that stairs are getting better although still has some shakiness lowering with steps. Pt states that he still has some PF discomfort although nothing today.         Objective Pt present to PT today with no distress at rest.     Pt tolerated exercises well today with improve ecc lowering noted on the L LE.       See Exercise, Manual, and Modality Logs for complete treatment.     Assessment/Plan  Pt showing some improvement in quad control and strength with activities today. Pt to continue working on functional and strengthening activities as tolerated by PF pain. Pt would benefit from PT to help improve quad strength, knee function, and functional mobility to return to work and pain free daily activities.       Progress per Plan of Care  Progress as tolerated           Manual Therapy:         mins  21568;  Therapeutic Exercise:    51    mins  36415;     Neuromuscular Shaniqua:    10    mins  62045;    Therapeutic Activity:          mins  92927;     Gait Training:        ___  mins  71322;     Ultrasound:          mins  42432;    Electrical Stimulation:         mins  46377 ( );  Dry Needling          mins self-pay    Timed Treatment:   61   mins   Total Treatment:     61   mins    Kang Flores, PT  Physical Therapist

## 2019-12-23 ENCOUNTER — TREATMENT (OUTPATIENT)
Dept: PHYSICAL THERAPY | Facility: CLINIC | Age: 29
End: 2019-12-23

## 2019-12-23 DIAGNOSIS — Z98.890 S/P REPAIR OF ANTERIOR CRUCIATE LIGAMENT: Primary | ICD-10-CM

## 2019-12-23 PROCEDURE — 97112 NEUROMUSCULAR REEDUCATION: CPT | Performed by: PHYSICAL THERAPIST

## 2019-12-23 PROCEDURE — 97110 THERAPEUTIC EXERCISES: CPT | Performed by: PHYSICAL THERAPIST

## 2019-12-23 NOTE — PROGRESS NOTES
Physical Therapy Daily Progress Note        Sandor Cordon reports 0/10 pain today at rest.  Pt reports that he still is having trouble going down stairs smoothly.         Objective Pt present to PT today with no distress at rest.     Pt tolerated exercises well today without increased pain in the L knee.     Pt with decreased control when lowering on the L LE.       See Exercise, Manual, and Modality Logs for complete treatment.     Assessment/Plan  Pt continues to progress slowly with quad motor control and training. Pt continues to have good motion and no gait abnormalities noted. Pt would benefit from PT to help increase knee stability, strength, and function.       Progress per Plan of Care  Progress as tolerated           Manual Therapy:         mins  14199;  Therapeutic Exercise:    47     mins  89864;     Neuromuscular Shaniqua:    10    mins  44728;    Therapeutic Activity:          mins  31621;     Gait Training:        ___  mins  51355;     Ultrasound:          mins  42165;    Electrical Stimulation:         mins  11347 ( );  Dry Needling          mins self-pay    Timed Treatment:   57   mins   Total Treatment:     62   mins    Kang Flores, PT  Physical Therapist

## 2020-01-03 ENCOUNTER — TREATMENT (OUTPATIENT)
Dept: PHYSICAL THERAPY | Facility: CLINIC | Age: 30
End: 2020-01-03

## 2020-01-03 DIAGNOSIS — Z98.890 S/P REPAIR OF ANTERIOR CRUCIATE LIGAMENT: Primary | ICD-10-CM

## 2020-01-03 PROCEDURE — 97110 THERAPEUTIC EXERCISES: CPT | Performed by: PHYSICAL THERAPIST

## 2020-01-03 PROCEDURE — 97112 NEUROMUSCULAR REEDUCATION: CPT | Performed by: PHYSICAL THERAPIST

## 2020-01-03 NOTE — PROGRESS NOTES
Physical Therapy Daily Progress Note        Sandor Cordon reports 0/10 pain today at rest.  Pt reports that stairs have gotten much easier with both ascending and descending. Pt reports that his anterior knee pain in the L knee has resolved. Pt sees doctor on the 13th.         Objective Pt present to PT today with no distress at rest.     Pt still has some decreased control in the L quad with lowering although no pain.     Pt had no pain with any activities today.       See Exercise, Manual, and Modality Logs for complete treatment.     Assessment/Plan  Pt continues to concentrate on quad control and strength to help improve stability of the L knee with stairs and functional activities. Pt would benefit from PT to help improve knee strength, stability, and functional mobility to help return to pain free daily activities.       Progress per Plan of Care  Progress as tolerated           Manual Therapy:         mins  54717;  Therapeutic Exercise:    46     mins  10203;     Neuromuscular Shaniqua:    15    mins  13539;    Therapeutic Activity:          mins  38098;     Gait Training:        ___  mins  66089;     Ultrasound:          mins  21177;    Electrical Stimulation:         mins  25027 ( );  Dry Needling          mins self-pay    Timed Treatment:   61   mins   Total Treatment:     63   mins    Kang Flores, PT  Physical Therapist

## 2020-01-07 ENCOUNTER — TREATMENT (OUTPATIENT)
Dept: PHYSICAL THERAPY | Facility: CLINIC | Age: 30
End: 2020-01-07

## 2020-01-07 DIAGNOSIS — Z98.890 S/P REPAIR OF ANTERIOR CRUCIATE LIGAMENT: Primary | ICD-10-CM

## 2020-01-07 PROCEDURE — 97112 NEUROMUSCULAR REEDUCATION: CPT | Performed by: PHYSICAL THERAPIST

## 2020-01-07 PROCEDURE — 97110 THERAPEUTIC EXERCISES: CPT | Performed by: PHYSICAL THERAPIST

## 2020-01-07 NOTE — PROGRESS NOTES
Physical Therapy Daily Progress Note        Sandor Cordon reports 0/10 pain today at rest.  Pt reports that his quad had a little stinging after last session with NMES/TKE activity. Pt reports he is going to the doctor next Monday and feels that he is ready to be released to work.         Objective Pt present to PT today with no distress at rest.     Pt with shaking in quad with ecc lowering exercise.       See Exercise, Manual, and Modality Logs for complete treatment.     Assessment/Plan  Pt has no pain with functional activities and good ROM in the L knee although still exhibiting some decreased control in the L quad. Pt would benefit from continued PT to help continue to improve motor control, knee strength, and knee stability to return to work and daily activities.       Progress per Plan of Care  Assess quad strength           Manual Therapy:         mins  44390;  Therapeutic Exercise:    46     mins  71916;     Neuromuscular Shaniqua:    12    mins  55579;    Therapeutic Activity:          mins  06689;     Gait Training:        ___  mins  99386;     Ultrasound:          mins  10352;    Electrical Stimulation:         mins  03581 ( );  Dry Needling          mins self-pay    Timed Treatment:  58    mins   Total Treatment:     58   mins    Kang Flores, PT  Physical Therapist

## 2024-11-18 NOTE — PROGRESS NOTES
Subjective   Sandor Cordon reports: 3-4/10 pain at rest. Sore in the morning after waking up but gets better with movement through the day.     Objective   Left knee AAROM: 80 deg flex     Gait training with single crutch: cues for equal step length, knee flexion through swing phase.     See Exercise, Manual, and Modality Logs for complete treatment.       Assessment/Plan  Patient tolerating exercises well. Patient able to walk on single crutch with good gait pattern when cued. Will continue working on strength and gait.   Progress per Plan of Care           Manual Therapy:         mins  36065;  Therapeutic Exercise:    43     mins  32744;     Neuromuscular Shaniqua:        mins  71622;    Therapeutic Activity:          mins  45007;     Gait Trainin     mins  84201;     Ultrasound:          mins  46347;   Iontophoresis          mins  00862   Electrical Stimulation:         mins  66987 ( );  Dry Needling          mins self-pay  Fluidotherapy          mins 60448    Timed Treatment:   54   mins   Total Treatment:     64   mins    Yani Haley, PT  Physical Therapist  
Detail Level: Simple
Detail Level: Zone
Detail Level: Generalized
Sunscreen Recommendations: Sunscreen with a minimum of 30 SPF, zinc or titanium such as Neutrogena or Blue Lizard.

## 2025-02-18 ENCOUNTER — PATIENT ROUNDING (BHMG ONLY) (OUTPATIENT)
Dept: URGENT CARE | Facility: CLINIC | Age: 35
End: 2025-02-18
Payer: MEDICAID

## 2025-02-19 ENCOUNTER — OFFICE VISIT (OUTPATIENT)
Dept: INTERNAL MEDICINE | Facility: CLINIC | Age: 35
End: 2025-02-19
Payer: MEDICAID

## 2025-02-19 VITALS
TEMPERATURE: 98.2 F | WEIGHT: 299 LBS | DIASTOLIC BLOOD PRESSURE: 100 MMHG | SYSTOLIC BLOOD PRESSURE: 160 MMHG | HEIGHT: 69 IN | HEART RATE: 107 BPM | OXYGEN SATURATION: 97 % | BODY MASS INDEX: 44.28 KG/M2

## 2025-02-19 DIAGNOSIS — Z13.220 SCREENING FOR LIPID DISORDERS: ICD-10-CM

## 2025-02-19 DIAGNOSIS — Z13.0 SCREENING FOR DISORDER OF BLOOD AND BLOOD-FORMING ORGANS: ICD-10-CM

## 2025-02-19 DIAGNOSIS — Z13.228 SCREENING FOR ENDOCRINE, METABOLIC AND IMMUNITY DISORDER: ICD-10-CM

## 2025-02-19 DIAGNOSIS — I10 HYPERTENSION, UNSPECIFIED TYPE: ICD-10-CM

## 2025-02-19 DIAGNOSIS — Z13.0 SCREENING FOR ENDOCRINE, METABOLIC AND IMMUNITY DISORDER: ICD-10-CM

## 2025-02-19 DIAGNOSIS — Z13.29 SCREENING FOR ENDOCRINE, METABOLIC AND IMMUNITY DISORDER: ICD-10-CM

## 2025-02-19 DIAGNOSIS — Z76.89 ENCOUNTER TO ESTABLISH CARE: Primary | ICD-10-CM

## 2025-02-19 DIAGNOSIS — Z13.1 SCREENING FOR DIABETES MELLITUS: ICD-10-CM

## 2025-02-19 DIAGNOSIS — R10.11 RUQ PAIN: ICD-10-CM

## 2025-02-19 RX ORDER — LISINOPRIL 20 MG/1
TABLET ORAL
Qty: 28 TABLET | Refills: 0 | Status: SHIPPED | OUTPATIENT
Start: 2025-02-19 | End: 2025-03-26

## 2025-02-19 NOTE — PROGRESS NOTES
Date: 2025    Name: Sandor Cordon  : 1990    Chief Complaint:   Chief Complaint   Patient presents with    [facility]       HPI:  Sandor Cordon is a 34 y.o. male presents to establish care.      HTN.  Has taken medication in the past for blood pressure. Admits diastolic BP is typically in the 100's. Denies chest pain, dyspnea, orthopnea, palpitations, lower extremity edema, confusion, headaches, weakness, visual disturbances.    RUQ pain has been for about 2 weeks ago.  Started with a feeling of a pulled muscle in his back.  Leaning to the left, standing up helps reduce the pain.      Abdominal Pain  Pain location:  RUQ  Pain quality: gnawing and heavy    Pain severity:  Moderate  Onset quality:  Gradual  Duration:  2 weeks  Timing:  Constant  Progression:  Worsening   Relieved by:  NSAIDs  Worsened by:  Nothing  Associated symptoms: no chills, no fever, no nausea and no vomiting      Not up to date on dental exams.  Brushes teeth most days.   Eye exam up to date. No corrective lenses, no vision changes. Noticed he wasn't able to focus easily with right eye when deer hunting, eye exam was normal.      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   Typical American diet.    Drinks alcohol daily, 4-10 beers.   Not taking supplements.  Smoked since he was 15 years old, 1 ppd.    Snores.      Sexually active. Heterosexual. Monogamous relationship x 5 years.   No ED.  Libido normal.    Wakes up more frequently to urinate throughout the night in the past couple of years.       History:    Past Medical History:   Diagnosis Date    Alcohol use     Current every day smoker     Hypertension     Morbid obesity with BMI of 40.0-44.9, adult     Risk factors for obstructive sleep apnea     Tibial plateau fracture, left 2019       Past Surgical History:   Procedure Laterality Date    KNEE ARTHROSCOPY Left 2019    Procedure: knee diagnostic arthroscopy left,  "reduction of tibial plateau fracture, fixation with two compression screws, assessment of ACL rupture, partial lateral meniscectomy, lateral chondroplasty;  Surgeon: Wes Maradiaga MD;  Location: [facility];  Service: Orthopedics    KNEE ARTHROSCOPY Left 8/29/2019    Procedure: DIAGNOSTIC KNEE ARTHROSCOPY LEFT WITH PARTIAL LATERAL MENISECTOMY, ONE COMPARTMENT CHONDROPLASTY, ANTERIOR CRUCIATE LIGAMENT ALLOGRAFT RECONSTRUCTION;  Surgeon: Wes Maradiaga MD;  Location: [facility];  Service: Orthopedics       History reviewed. No pertinent family history.    Social History     Socioeconomic History    Marital status: Single   Tobacco Use    Smoking status: Every Day     Average packs/day: 1 pack/day for 13.0 years (13.0 ttl pk-yrs)     Types: Cigarettes     Start date: 2/2/2005    Smokeless tobacco: Never   Vaping Use    Vaping status: Never Used   Substance and Sexual Activity    Alcohol use: Yes     Comment: 1 case of beer/weekly    Drug use: No    Sexual activity: Defer       No Known Allergies      Current Outpatient Medications:     lisinopril (PRINIVIL,ZESTRIL) 20 MG tablet, Take 0.5 tablets by mouth Daily for 14 days, THEN 1 tablet Daily for 21 days., Disp: 28 tablet, Rfl: 0    ROS:  Review of Systems    VS:  Vitals:    02/19/25 1353   BP: 160/100   Pulse: 107   Temp: 98.2 °F (36.8 °C)   SpO2: 97%   Weight: 136 kg (299 lb)   Height: 175.3 cm (69.02\")     Body mass index is 44.13 kg/m².         PE:  Physical Exam  Constitutional:       Appearance: He is not ill-appearing.   HENT:      Head: Normocephalic.      Right Ear: External ear normal.      Left Ear: External ear normal.   Eyes:      Conjunctiva/sclera: Conjunctivae normal.      Pupils: Pupils are equal, round, and reactive to light.   Cardiovascular:      Rate and Rhythm: Normal rate and regular rhythm.      Pulses:           Radial pulses are 2+ on the right side and 2+ on the left side.        Dorsalis pedis pulses are 2+ on the right side and 2+ " on the left side.      Heart sounds: Normal heart sounds.   Pulmonary:      Effort: Pulmonary effort is normal.      Breath sounds: Normal breath sounds.   Abdominal:      General: Bowel sounds are normal.      Palpations: Abdomen is soft.      Tenderness:  in the right upper quadrant There is no right CVA tenderness or left CVA tenderness.   Musculoskeletal:      Cervical back: Normal range of motion and neck supple.   Skin:     General: Skin is warm.      Capillary Refill: Capillary refill takes less than 2 seconds.   Neurological:      Mental Status: He is alert and oriented to person, place, and time.      Coordination: Coordination normal.      Gait: Gait normal.   Psychiatric:         Attention and Perception: Attention normal.         Mood and Affect: Mood and affect normal.         Speech: Speech normal.         Behavior: Behavior normal.           Assessment/Plan:       Diagnoses and all orders for this visit:    1. Encounter to establish care (Primary)    2. Hypertension, unspecified type  -     lisinopril (PRINIVIL,ZESTRIL) 20 MG tablet; Take 0.5 tablets by mouth Daily for 14 days, THEN 1 tablet Daily for 21 days.  Dispense: 28 tablet; Refill: 0        - Follow heart healthy diet.  Keep sodium intake < 1500 mg per day.  Avoid processed & fast foods.          - Exercise as tolerated, with a goal of 30 minutes of moderate exercise most days.         - Take medications as prescribed.    3. RUQ pain  -     US Gallbladder; Future    4. Screening for lipid disorders  -     Lipid Panel    5. Screening for endocrine, metabolic and immunity disorder  -     Comprehensive Metabolic Panel  -     T4, Free  -     TSH    6. Screening for disorder of blood and blood-forming organs  -     CBC & Differential    7. Screening for diabetes mellitus  -     Hemoglobin A1c               Return in about 4 weeks (around 3/19/2025) for Annual.

## 2025-02-20 LAB
ALBUMIN SERPL-MCNC: 4.2 G/DL (ref 3.5–5.2)
ALBUMIN/GLOB SERPL: 1.4 G/DL
ALP SERPL-CCNC: 118 U/L (ref 39–117)
ALT SERPL-CCNC: 35 U/L (ref 1–41)
AST SERPL-CCNC: 33 U/L (ref 1–40)
BASOPHILS # BLD AUTO: 0.1 10*3/MM3 (ref 0–0.2)
BASOPHILS NFR BLD AUTO: 0.9 % (ref 0–1.5)
BILIRUB SERPL-MCNC: 0.4 MG/DL (ref 0–1.2)
BUN SERPL-MCNC: 6 MG/DL (ref 6–20)
BUN/CREAT SERPL: 6.7 (ref 7–25)
CALCIUM SERPL-MCNC: 9.8 MG/DL (ref 8.6–10.5)
CHLORIDE SERPL-SCNC: 100 MMOL/L (ref 98–107)
CHOLEST SERPL-MCNC: 169 MG/DL (ref 0–200)
CO2 SERPL-SCNC: 26.7 MMOL/L (ref 22–29)
CREAT SERPL-MCNC: 0.9 MG/DL (ref 0.76–1.27)
EGFRCR SERPLBLD CKD-EPI 2021: 114.2 ML/MIN/1.73
EOSINOPHIL # BLD AUTO: 0.17 10*3/MM3 (ref 0–0.4)
EOSINOPHIL NFR BLD AUTO: 1.5 % (ref 0.3–6.2)
ERYTHROCYTE [DISTWIDTH] IN BLOOD BY AUTOMATED COUNT: 14 % (ref 12.3–15.4)
GLOBULIN SER CALC-MCNC: 3.1 GM/DL
GLUCOSE SERPL-MCNC: 96 MG/DL (ref 65–99)
HBA1C MFR BLD: 5.6 % (ref 4.8–5.6)
HCT VFR BLD AUTO: 51.3 % (ref 37.5–51)
HDLC SERPL-MCNC: 42 MG/DL (ref 40–60)
HGB BLD-MCNC: 17.4 G/DL (ref 13–17.7)
IMM GRANULOCYTES # BLD AUTO: 0.11 10*3/MM3 (ref 0–0.05)
IMM GRANULOCYTES NFR BLD AUTO: 1 % (ref 0–0.5)
LDLC SERPL CALC-MCNC: 110 MG/DL (ref 0–100)
LYMPHOCYTES # BLD AUTO: 3.43 10*3/MM3 (ref 0.7–3.1)
LYMPHOCYTES NFR BLD AUTO: 30.2 % (ref 19.6–45.3)
MCH RBC QN AUTO: 30.6 PG (ref 26.6–33)
MCHC RBC AUTO-ENTMCNC: 33.9 G/DL (ref 31.5–35.7)
MCV RBC AUTO: 90.2 FL (ref 79–97)
MONOCYTES # BLD AUTO: 0.8 10*3/MM3 (ref 0.1–0.9)
MONOCYTES NFR BLD AUTO: 7.1 % (ref 5–12)
NEUTROPHILS # BLD AUTO: 6.73 10*3/MM3 (ref 1.7–7)
NEUTROPHILS NFR BLD AUTO: 59.3 % (ref 42.7–76)
NRBC BLD AUTO-RTO: 0 /100 WBC (ref 0–0.2)
PLATELET # BLD AUTO: 331 10*3/MM3 (ref 140–450)
POTASSIUM SERPL-SCNC: 4.9 MMOL/L (ref 3.5–5.2)
PROT SERPL-MCNC: 7.3 G/DL (ref 6–8.5)
RBC # BLD AUTO: 5.69 10*6/MM3 (ref 4.14–5.8)
SODIUM SERPL-SCNC: 139 MMOL/L (ref 136–145)
T4 FREE SERPL-MCNC: 1.52 NG/DL (ref 0.92–1.68)
TRIGL SERPL-MCNC: 93 MG/DL (ref 0–150)
TSH SERPL DL<=0.005 MIU/L-ACNC: 1.19 UIU/ML (ref 0.27–4.2)
VLDLC SERPL CALC-MCNC: 17 MG/DL (ref 5–40)
WBC # BLD AUTO: 11.34 10*3/MM3 (ref 3.4–10.8)

## 2025-02-20 NOTE — PROGRESS NOTES
LDL cholesterol very slightly elevated. While there are a few values just outside lab parameters, I consider all the other values normal.

## 2025-02-22 NOTE — ANESTHESIA POSTPROCEDURE EVALUATION
Patient: Sandor Cordon    Procedure Summary     Date:  08/29/19 Room / Location:  [facility] OR  /  [facility]    Anesthesia Start:  1338 Anesthesia Stop:  1628    Procedure:  DIAGNOSTIC KNEE ARTHROSCOPY LEFT WITH PARTIAL LATERAL MENISECTOMY, ONE COMPARTMENT CHONDROPLASTY, ANTERIOR CRUCIATE LIGAMENT ALLOGRAFT RECONSTRUCTION (Left Knee) Diagnosis:       Rupture of anterior cruciate ligament of left knee, initial encounter      (Rupture of anterior cruciate ligament of left knee, initial encounter [S83.512A])    Surgeon:  Wes Maradiaga MD Provider:  Maxim Gooden CRNA    Anesthesia Type:  general with block ASA Status:  3          Anesthesia Type: general with block  Last vitals  BP   114/63 (08/29/19 1633)   Temp   99 °F (37.2 °C) (08/29/19 1633)   Pulse   95 (08/29/19 1633)   Resp   20 (08/29/19 1633)     SpO2   97 % (08/29/19 1633)     Post Anesthesia Care and Evaluation    Patient location during evaluation: PACU  Patient participation: complete - patient participated  Level of consciousness: awake  Pain score: 3  Pain management: adequate  Airway patency: patent  Anesthetic complications: No anesthetic complications  PONV Status: controlled  Cardiovascular status: acceptable and stable  Respiratory status: acceptable and face mask  Hydration status: acceptable      
4 = No assist / stand by assistance

## 2025-03-08 ENCOUNTER — HOSPITAL ENCOUNTER (OUTPATIENT)
Dept: ULTRASOUND IMAGING | Facility: HOSPITAL | Age: 35
Discharge: HOME | End: 2025-03-08
Payer: MEDICAID

## 2025-03-08 DIAGNOSIS — R10.11 RUQ PAIN: ICD-10-CM

## 2025-03-08 PROCEDURE — 76705 ECHO EXAM OF ABDOMEN: CPT

## 2025-03-18 NOTE — DISCHARGE INSTRUCTIONS
3/20/2025    To Cheri Willingham  : 1980      This letter is to advise you that your recent BLOODWORK for Sexually-Transmitted Diseases (HIV, hepatitis B, hepatitis C, and syphilis) results were reviewed by me and are NORMAL.  Please contact our office for an appointment if you have any additional concerns.    JOB Egan     PAT PASS GIVEN/REVIEWED WITH PT.  VERBALIZED UNDERSTANDING OF THE FOLLOWING:  DO NOT EAT, DRINK, SMOKE, USE SMOKELESS TOBACCO OR CHEW GUM AFTER MIDNIGHT THE NIGHT BEFORE SURGERY.  THIS ALSO INCLUDES HARD CANDIES AND MINTS.    DO NOT SHAVE THE AREA TO BE OPERATED ON AT LEAST 48 HOURS PRIOR TO THE PROCEDURE.  DO NOT WEAR MAKE UP OR NAIL POLISH.  DO NOT LEAVE IN ANY PIERCING OR WEAR JEWELRY THE DAY OF SURGERY.      DO NOT USE ADHESIVES IF YOU WEAR DENTURES.    DO NOT WEAR EYE CONTACTS; BRING IN YOUR GLASSES.    ONLY TAKE MEDICATION THE MORNING OF YOUR PROCEDURE IF INSTRUCTED BY YOUR SURGEON WITH ENOUGH WATER TO SWALLOW THE MEDICATION.  IF YOUR SURGEON DID NOT SPECIFY WHICH MEDICATIONS TO TAKE, YOU WILL NEED TO CALL THEIR OFFICE FOR FURTHER INSTRUCTIONS AND DO AS THEY INSTRUCT.    LEAVE ANYTHING YOU CONSIDER VALUABLE AT HOME.    YOU WILL NEED TO ARRANGE FOR SOMEONE TO DRIVE YOU HOME AFTER SURGERY.  IT IS RECOMMENDED THAT YOU DO NOT DRIVE, WORK, DRINK ALCOHOL OR MAKE MAJOR DECISIONS FOR AT LEAST 24 HOURS AFTER YOUR PROCEDURE IS COMPLETE.      THE DAY OF YOUR PROCEDURE, BRING IN THE FOLLOWING IF APPLICABLE:   PICTURE ID AND INSURANCE/MEDICARE OR MEDICAID CARDS   COPY OF ADVANCED DIRECTIVE/LIVING WILL/POWER OR    CPAP/BIPAP/INHALERS   SKIN PREP SHEET   YOUR PREADMISSION TESTING PASS (IF NOT A PHONE HISTORY)    Chlorhexidine wipes along with instruction/verification sheet given to pt.  Instructed pt to apply stickers from chlorhexidine wipes to verification sheet once skin prep has been  completed, and to return to [facility] the day of the procedure.  Pt. Verbalizes understanding.

## 2025-03-25 ENCOUNTER — PATIENT MESSAGE (OUTPATIENT)
Dept: INTERNAL MEDICINE | Facility: CLINIC | Age: 35
End: 2025-03-25

## 2025-03-25 ENCOUNTER — OFFICE VISIT (OUTPATIENT)
Dept: INTERNAL MEDICINE | Facility: CLINIC | Age: 35
End: 2025-03-25
Payer: MEDICAID

## 2025-03-25 VITALS
HEIGHT: 69 IN | HEART RATE: 87 BPM | TEMPERATURE: 98.1 F | DIASTOLIC BLOOD PRESSURE: 94 MMHG | OXYGEN SATURATION: 97 % | BODY MASS INDEX: 43.84 KG/M2 | WEIGHT: 296 LBS | SYSTOLIC BLOOD PRESSURE: 136 MMHG

## 2025-03-25 DIAGNOSIS — I10 HYPERTENSION, UNSPECIFIED TYPE: Primary | ICD-10-CM

## 2025-03-25 DIAGNOSIS — K76.0 NAFLD (NONALCOHOLIC FATTY LIVER DISEASE): ICD-10-CM

## 2025-03-25 PROCEDURE — 99214 OFFICE O/P EST MOD 30 MIN: CPT | Performed by: NURSE PRACTITIONER

## 2025-03-25 PROCEDURE — 1159F MED LIST DOCD IN RCRD: CPT | Performed by: NURSE PRACTITIONER

## 2025-03-25 PROCEDURE — 1160F RVW MEDS BY RX/DR IN RCRD: CPT | Performed by: NURSE PRACTITIONER

## 2025-03-25 RX ORDER — LISINOPRIL 40 MG/1
40 TABLET ORAL DAILY
Qty: 90 TABLET | Refills: 3 | Status: SHIPPED | OUTPATIENT
Start: 2025-03-25

## 2025-03-25 NOTE — PROGRESS NOTES
"     Office Visit      Patient Name: Sandor Cordon  : 1990   MRN: [id number]     Chief Complaint:    Chief Complaint   Patient presents with    Hypertension     History of Present Illness  The patient presents for evaluation of hypertension and fatty liver.    He has been experiencing fatigue and drowsiness, which he attributes to a decrease in his previously elevated blood pressure. He has not observed any unusual symptoms associated with his current medication, lisinopril. His home blood pressure readings have consistently shown diastolic values ranging from 85 to 90. He reports occasional coughing episodes, which he believes are due to his smoking habit and seasonal allergies.    He has expressed concerns regarding his fatty liver condition. Despite not making any significant dietary modifications, he has experienced some weight loss. He maintains a home-cooked diet, predominantly consuming deer meat and potatoes, while avoiding processed foods as much as possible.          Subjective      I have reviewed and the following portions of the patient's history were updated as appropriate: past family history, past medical history, past social history, past surgical history and problem list.      Current Outpatient Medications:     lisinopril (PRINIVIL,ZESTRIL) 40 MG tablet, Take 1 tablet by mouth Daily., Disp: 90 tablet, Rfl: 3    No Known Allergies    Objective     Physical Exam:  Vital Signs:   Vitals:    25 1342   BP: 136/94   Pulse: 87   Temp: 98.1 °F (36.7 °C)   SpO2: 97%   Weight: 134 kg (296 lb)   Height: 175.3 cm (69.02\")     Body mass index is 43.69 kg/m².         Physical Exam  Constitutional:       Appearance: He is morbidly obese. He is not ill-appearing.   HENT:      Head: Normocephalic.      Right Ear: External ear normal.      Left Ear: External ear normal.   Eyes:      Conjunctiva/sclera: Conjunctivae normal.      Pupils: Pupils are equal, round, and reactive to light. "   Cardiovascular:      Rate and Rhythm: Normal rate and regular rhythm.      Pulses:           Radial pulses are 2+ on the right side and 2+ on the left side.        Dorsalis pedis pulses are 2+ on the right side and 2+ on the left side.      Heart sounds: Normal heart sounds.   Pulmonary:      Effort: Pulmonary effort is normal.      Breath sounds: Normal breath sounds.   Musculoskeletal:      Cervical back: Normal range of motion and neck supple.   Skin:     General: Skin is warm.      Capillary Refill: Capillary refill takes less than 2 seconds.   Neurological:      Mental Status: He is alert and oriented to person, place, and time.      Coordination: Coordination normal.      Gait: Gait normal.   Psychiatric:         Attention and Perception: Attention normal.         Mood and Affect: Mood and affect normal.         Speech: Speech normal.         Behavior: Behavior normal.             Assessment / Plan      Assessment/Plan:   Diagnoses and all orders for this visit:    1. Hypertension, unspecified type (Primary)  -     lisinopril (PRINIVIL,ZESTRIL) 40 MG tablet; Take 1 tablet by mouth Daily.  Dispense: 90 tablet; Refill: 3.  Increasing dose from 20 mg to 40 mg daily.  Will check in via myDocket message in 4 weeks.  If BP continues to be elevated, will contact clinic for further discussion.         - Follow heart healthy diet.  Keep sodium intake < 1500 mg per day.  Avoid processed & fast foods.          - Exercise as tolerated, with a goal of 30 minutes of moderate exercise most days.         - Take medications as prescribed.    2. NAFLD (nonalcoholic fatty liver disease)        - Eat heart healthy, low carb diet. Abstain from alcohol.  Be physically active most days of the week.  Losing weight is recommended.             Follow Up:   Return if symptoms worsen or fail to improve.    Patient was given instructions and counseling regarding his condition or for health maintenance advice. Please see specific  information pulled into the AVS if appropriate.       [facility] [address] [facility]     Please note that portions of this note may have been completed with a voice recognition program. Efforts were made to edit dictation, but occasionally words are mistranscribed.

## 2025-04-25 RX ORDER — HYDROCHLOROTHIAZIDE 25 MG/1
25 TABLET ORAL DAILY
Qty: 30 TABLET | Refills: 1 | Status: SHIPPED | OUTPATIENT
Start: 2025-04-25

## 2025-05-21 ENCOUNTER — OFFICE VISIT (OUTPATIENT)
Dept: INTERNAL MEDICINE | Facility: CLINIC | Age: 35
End: 2025-05-21
Payer: MEDICAID

## 2025-05-21 VITALS
BODY MASS INDEX: 35.25 KG/M2 | TEMPERATURE: 98.2 F | OXYGEN SATURATION: 97 % | HEIGHT: 69 IN | WEIGHT: 238 LBS | DIASTOLIC BLOOD PRESSURE: 78 MMHG | SYSTOLIC BLOOD PRESSURE: 130 MMHG | HEART RATE: 105 BPM

## 2025-05-21 DIAGNOSIS — R42 DIZZINESS: ICD-10-CM

## 2025-05-21 DIAGNOSIS — I10 HYPERTENSION, UNSPECIFIED TYPE: Primary | ICD-10-CM

## 2025-05-21 PROCEDURE — 1160F RVW MEDS BY RX/DR IN RCRD: CPT | Performed by: NURSE PRACTITIONER

## 2025-05-21 PROCEDURE — 1159F MED LIST DOCD IN RCRD: CPT | Performed by: NURSE PRACTITIONER

## 2025-05-21 PROCEDURE — 99214 OFFICE O/P EST MOD 30 MIN: CPT | Performed by: NURSE PRACTITIONER

## 2025-05-21 RX ORDER — LISINOPRIL 10 MG/1
10 TABLET ORAL DAILY
Qty: 90 TABLET | Refills: 1 | Status: SHIPPED | OUTPATIENT
Start: 2025-05-21

## 2025-05-21 RX ORDER — HYDROCHLOROTHIAZIDE 12.5 MG/1
12.5 TABLET ORAL DAILY
Qty: 30 TABLET | Refills: 1
Start: 2025-05-21

## 2025-05-21 NOTE — PROGRESS NOTES
Office Visit      Patient Name: Sandor Cordon  : 1990   MRN: [id number]     Chief Complaint:    Chief Complaint   Patient presents with    Fatigue     Weakness, dizzy, bp drops in the afternoon: 92/57 pulse 123, 109/93 pulse 142     History of Present Illness  The patient presents for evaluation of blood pressure management.    He has been experiencing episodes of hypotension in the afternoons, with a recorded blood pressure reading of 127/84 this morning. His current medication regimen includes half a tablet of lisinopril 20 mg and a full tablet of HCTZ 25 mg, both administered in the morning. He reports frequent urination, which he attributes to his HCTZ intake, but notes that it effectively manages his edema. Despite maintaining adequate hydration, he experiences symptoms of presyncope when rising from a seated position or after physical exertion, such as mowing the lawn. He also reports tachycardia during these hypotensive episodes but does not experience any palpitations.     He does not own an Apple watch or iPhone to monitor his heart rate. He experiences lightheadedness and dizziness upon standing, particularly in warmer weather, but does not report any associated visual disturbances. He also reports persistent fatigue throughout the day, with the exception of the early morning hours. He recalls similar symptoms of fatigue when he was on antihypertensive medication in his youth.    FAMILY HISTORY  His mother has a history of blood pressure issues, fainting episodes, and has a pacemaker. She also has atrial fibrillation.        Subjective      I have reviewed and the following portions of the patient's history were updated as appropriate: past family history, past medical history, past social history, past surgical history and problem list.      Current Outpatient Medications:     hydroCHLOROthiazide 12.5 MG tablet, Take 1 tablet by mouth Daily., Disp: 30 tablet, Rfl: 1    lisinopril  "(PRINIVIL,ZESTRIL) 10 MG tablet, Take 1 tablet by mouth Daily., Disp: 90 tablet, Rfl: 1    No Known Allergies    Objective     Physical Exam:  Vital Signs:   Vitals:    05/21/25 0832   BP: 130/78   Pulse: 105   Temp: 98.2 °F (36.8 °C)   SpO2: 97%   Weight: 108 kg (238 lb)   Height: 175.3 cm (69.02\")     Body mass index is 35.13 kg/m².         Physical Exam  Constitutional:       Appearance: He is not ill-appearing.   HENT:      Head: Normocephalic.      Right Ear: Tympanic membrane, ear canal and external ear normal.      Left Ear: Tympanic membrane, ear canal and external ear normal.   Eyes:      Extraocular Movements: Extraocular movements intact.      Conjunctiva/sclera: Conjunctivae normal.      Pupils: Pupils are equal, round, and reactive to light.   Cardiovascular:      Rate and Rhythm: Normal rate and regular rhythm.      Pulses:           Radial pulses are 2+ on the right side and 2+ on the left side.        Dorsalis pedis pulses are 2+ on the right side and 2+ on the left side.      Heart sounds: Normal heart sounds.   Pulmonary:      Effort: Pulmonary effort is normal.      Breath sounds: Normal breath sounds.   Musculoskeletal:      Cervical back: Normal range of motion and neck supple.   Skin:     General: Skin is warm.      Capillary Refill: Capillary refill takes less than 2 seconds.   Neurological:      Mental Status: He is alert and oriented to person, place, and time.      Cranial Nerves: Cranial nerves 2-12 are intact.      Coordination: Coordination normal.      Gait: Gait normal.   Psychiatric:         Attention and Perception: Attention normal.         Mood and Affect: Mood and affect normal.         Speech: Speech normal.         Behavior: Behavior normal.             Assessment / Plan      Assessment/Plan:   Diagnoses and all orders for this visit:    1. Hypertension, unspecified type (Primary)  -     lisinopril (PRINIVIL,ZESTRIL) 10 MG tablet; Take 1 tablet by mouth Daily.  Dispense: 90 " tablet; Refill: 1  -     hydroCHLOROthiazide 12.5 MG tablet; Take 1 tablet by mouth Daily.  Dispense: 30 tablet; Refill: 1        - Follow heart healthy diet.  Keep sodium intake < 1500 mg per day.  Avoid processed & fast foods.          - Exercise as tolerated, with a goal of 30 minutes of moderate exercise most days.         - Take medications as prescribed.    2. Dizziness        - Make position changes slowly.  Monitor heart rate when dizzy, if it is abnormal when checked during dizzy episodes will refer to cardiology.         Follow Up:   Return in about 6 weeks (around 7/2/2025) for Annual.    Patient was given instructions and counseling regarding his condition or for health maintenance advice. Please see specific information pulled into the AVS if appropriate.       [facility]     Please note that portions of this note may have been completed with a voice recognition program. Efforts were made to edit dictation, but occasionally words are mistranscribed.    Detail Level: Detailed Quality 226: Preventive Care And Screening: Tobacco Use: Screening And Cessation Intervention: Patient screened for tobacco use and is an ex/non-smoker Quality 130: Documentation Of Current Medications In The Medical Record: Current Medications Documented Quality 431: Preventive Care And Screening: Unhealthy Alcohol Use - Screening: Patient not identified as an unhealthy alcohol user when screened for unhealthy alcohol use using a systematic screening method

## 2025-07-10 DIAGNOSIS — I10 HYPERTENSION, UNSPECIFIED TYPE: ICD-10-CM

## 2025-07-10 RX ORDER — HYDROCHLOROTHIAZIDE 25 MG/1
25 TABLET ORAL DAILY
Qty: 30 TABLET | Refills: 1 | Status: SHIPPED | OUTPATIENT
Start: 2025-07-10

## (undated) DEVICE — 4.5 MM FULL RADIUS STRAIGHT                                    BLADES, POWER/EP-1, YELLOW, PACKAGED                                    6 PER BOX, STERILE: Brand: DYONICS

## (undated) DEVICE — PK KN ARTHSCP 20

## (undated) DEVICE — SPNG GZ STRL 2S 4X4 12PLY

## (undated) DEVICE — T-DRAPE,EXTREMITY,STERILE: Brand: MEDLINE

## (undated) DEVICE — DRSNG GZ PETROLTM XEROFORM CURAD 1X8IN STRL

## (undated) DEVICE — SPNG GZ WOVN 4X4IN 12PLY 10/BX STRL

## (undated) DEVICE — GLV SURG SENSICARE W/ALOE PF LF 8 STRL

## (undated) DEVICE — BIT DRL 1.5

## (undated) DEVICE — 3M™ STERI-DRAPE™ U-DRAPE 1015: Brand: STERI-DRAPE™

## (undated) DEVICE — CUFF SCD HEMOFORCE SEQ CALF STD MD

## (undated) DEVICE — TBG INFLOW FMS DUO W/O 1WY VLV

## (undated) DEVICE — PROXIMATE SKIN STAPLERS (35 WIDE) CONTAINS 35 STAINLESS STEEL STAPLES (FIXED HEAD): Brand: PROXIMATE

## (undated) DEVICE — GLV SURG SENSICARE ORTHO PF LF 8 STRL

## (undated) DEVICE — OCCLUSIVE GAUZE STRIP,3% BISMUTH TRIBROMOPHENATE IN PETROLATUM BLEND: Brand: XEROFORM

## (undated) DEVICE — Device

## (undated) DEVICE — DRSNG WND GZ PAD BORDERED LF 4X5IN STRL

## (undated) DEVICE — IMPLANTABLE DEVICE
Type: IMPLANTABLE DEVICE | Site: KNEE | Status: NON-FUNCTIONAL
Removed: 2019-04-09